# Patient Record
Sex: FEMALE | Race: WHITE | NOT HISPANIC OR LATINO | URBAN - METROPOLITAN AREA
[De-identification: names, ages, dates, MRNs, and addresses within clinical notes are randomized per-mention and may not be internally consistent; named-entity substitution may affect disease eponyms.]

---

## 2017-12-14 ENCOUNTER — OUTPATIENT (OUTPATIENT)
Dept: OUTPATIENT SERVICES | Facility: HOSPITAL | Age: 57
LOS: 1 days | End: 2017-12-14

## 2018-01-03 ENCOUNTER — OUTPATIENT (OUTPATIENT)
Dept: OUTPATIENT SERVICES | Facility: HOSPITAL | Age: 58
LOS: 1 days | End: 2018-01-03

## 2018-10-04 ENCOUNTER — OUTPATIENT (OUTPATIENT)
Dept: OUTPATIENT SERVICES | Facility: HOSPITAL | Age: 58
LOS: 1 days | End: 2018-10-04

## 2019-09-18 ENCOUNTER — APPOINTMENT (OUTPATIENT)
Dept: MRI IMAGING | Facility: CLINIC | Age: 59
End: 2019-09-18
Payer: COMMERCIAL

## 2019-09-18 PROCEDURE — 72148 MRI LUMBAR SPINE W/O DYE: CPT

## 2020-10-13 ENCOUNTER — APPOINTMENT (OUTPATIENT)
Dept: MAMMOGRAPHY | Facility: CLINIC | Age: 60
End: 2020-10-13
Payer: COMMERCIAL

## 2020-10-13 PROCEDURE — 77067 SCR MAMMO BI INCL CAD: CPT

## 2020-10-13 PROCEDURE — 77063 BREAST TOMOSYNTHESIS BI: CPT

## 2020-10-27 ENCOUNTER — TRANSCRIPTION ENCOUNTER (OUTPATIENT)
Age: 60
End: 2020-10-27

## 2020-10-28 ENCOUNTER — APPOINTMENT (OUTPATIENT)
Dept: ULTRASOUND IMAGING | Facility: CLINIC | Age: 60
End: 2020-10-28
Payer: COMMERCIAL

## 2020-10-28 ENCOUNTER — APPOINTMENT (OUTPATIENT)
Dept: MAMMOGRAPHY | Facility: CLINIC | Age: 60
End: 2020-10-28
Payer: COMMERCIAL

## 2020-10-28 PROBLEM — Z00.00 ENCOUNTER FOR PREVENTIVE HEALTH EXAMINATION: Status: ACTIVE | Noted: 2020-10-28

## 2020-10-28 PROCEDURE — 77065 DX MAMMO INCL CAD UNI: CPT | Mod: LT

## 2020-10-28 PROCEDURE — G0279: CPT | Mod: LT

## 2020-10-28 PROCEDURE — 76642 ULTRASOUND BREAST LIMITED: CPT | Mod: LT

## 2020-12-28 ENCOUNTER — OUTPATIENT (OUTPATIENT)
Dept: OUTPATIENT SERVICES | Facility: HOSPITAL | Age: 60
LOS: 1 days | End: 2020-12-28

## 2021-08-30 ENCOUNTER — APPOINTMENT (OUTPATIENT)
Dept: RADIOLOGY | Facility: CLINIC | Age: 61
End: 2021-08-30
Payer: COMMERCIAL

## 2021-08-30 PROCEDURE — 71046 X-RAY EXAM CHEST 2 VIEWS: CPT

## 2021-10-05 ENCOUNTER — APPOINTMENT (OUTPATIENT)
Dept: MAMMOGRAPHY | Facility: CLINIC | Age: 61
End: 2021-10-05
Payer: COMMERCIAL

## 2021-10-05 PROCEDURE — 77067 SCR MAMMO BI INCL CAD: CPT

## 2021-10-05 PROCEDURE — 77063 BREAST TOMOSYNTHESIS BI: CPT

## 2022-01-11 ENCOUNTER — NON-APPOINTMENT (OUTPATIENT)
Age: 62
End: 2022-01-11

## 2022-02-04 ENCOUNTER — LABORATORY RESULT (OUTPATIENT)
Age: 62
End: 2022-02-04

## 2022-02-04 ENCOUNTER — APPOINTMENT (OUTPATIENT)
Dept: RHEUMATOLOGY | Facility: CLINIC | Age: 62
End: 2022-02-04
Payer: COMMERCIAL

## 2022-02-04 ENCOUNTER — NON-APPOINTMENT (OUTPATIENT)
Age: 62
End: 2022-02-04

## 2022-02-04 VITALS
TEMPERATURE: 97.2 F | WEIGHT: 132 LBS | BODY MASS INDEX: 20.72 KG/M2 | SYSTOLIC BLOOD PRESSURE: 150 MMHG | OXYGEN SATURATION: 99 % | DIASTOLIC BLOOD PRESSURE: 74 MMHG | HEIGHT: 67 IN | HEART RATE: 73 BPM

## 2022-02-04 PROCEDURE — 99204 OFFICE O/P NEW MOD 45 MIN: CPT | Mod: 25

## 2022-02-04 PROCEDURE — 36415 COLL VENOUS BLD VENIPUNCTURE: CPT

## 2022-02-04 NOTE — REASON FOR VISIT
Addended by: FRANCESCO FLOYD on: 11/14/2018 07:45 AM     Modules accepted: Orders     [Initial Evaluation] : an initial evaluation

## 2022-02-04 NOTE — ASSESSMENT
[FreeTextEntry1] : 60 y/o female w/ psoriasis referred to rheumatology for positive KENA and evaluation for underlying rheum disease. \par Pt reports 14 months of dizziness, intermittent pruritus, blisters on fingertips, underside of toes with burning feelings, redness of knuckles, pain in achilles, sore throat, tongue, swollen throat, numbness of mouth, throat, lips, burning chest pain, cough, mucus in throat. Pt has mild psoriasis of face and scalp - face was treated with laser therapy. Pt has seen multiple specialists for these symptoms with diagnosis only of laryngeal reflux. Pt has been treated for GERD with PPI without any improvement in the chest burning.\par No family Hx of rheum diseases\par Workup by PCP with KENA+, Coulter+\par \par Patient has various non-specific symptoms without diagnosis x 14 months, but pt has not seen a rheumatologist. Pt's symptoms are not quite classic for SLE. However, some of patient's symptoms such as blisters of fingers/toes in cold temperature (Raynaud-like?), rashes, GI issues are concerning for entities such as myositis and scleroderma and should be evaluated. Pt also has psoriasis which can be associated with PsA but pt does not have inflammatory joint pains, uveitis, dactylitis, nail changes. Pt does reports achilles pain and swelling (enthesitis).\par \par - Obtain labwork to evaluate for underlying autoimmune inflammatory rheumatic diseases\par - RTC 1 month to discuss results and next steps\par

## 2022-02-04 NOTE — HISTORY OF PRESENT ILLNESS
[FreeTextEntry1] : 62 y/o female w/ psoriasis referred to rheumatology for positive KENA and evaluation for underlying rheum disease. \par Pt reports 14 months of dizziness, intermittent pruritus, blisters on fingertips, underside of toes with burning feelings, redness of knuckles, pain in achilles, sore throat, tongue, swollen throat, numbness of mouth, throat, lips, burning chest pain, cough, mucus in throat. Pt has mild psoriasis of face and scalp - face was treated with laser therapy. Pt has seen multiple specialists for these symptoms with diagnosis only of laryngeal reflux. Pt has been treated for GERD with PPI without any improvement in the chest burning.\par Pt denies uveitis, dactylitis, nail changes.\par No family Hx of rheum diseases\par \par WORKUP: \par Remarkable for (12/2021): KENA+, Coulter+ \par Normal/neg (12/2021): CBC, CMP, ESR, CRP, dsDNA, SSA, SSB, RNP, B12, Vit D 25-OH, TSH, HgbA1C, Lyme\par \par

## 2022-02-06 LAB
25(OH)D3 SERPL-MCNC: 78.8 NG/ML
ALBUMIN SERPL ELPH-MCNC: 4.9 G/DL
ALP BLD-CCNC: 89 U/L
ALT SERPL-CCNC: 26 U/L
ANION GAP SERPL CALC-SCNC: 14 MMOL/L
APPEARANCE: CLEAR
AST SERPL-CCNC: 21 U/L
BASOPHILS # BLD AUTO: 0.03 K/UL
BASOPHILS NFR BLD AUTO: 0.9 %
BILIRUB SERPL-MCNC: 0.4 MG/DL
BILIRUBIN URINE: NEGATIVE
BLOOD URINE: NEGATIVE
BUN SERPL-MCNC: 17 MG/DL
C3 SERPL-MCNC: 100 MG/DL
C4 SERPL-MCNC: 23 MG/DL
CALCIUM SERPL-MCNC: 10.1 MG/DL
CHLORIDE SERPL-SCNC: 99 MMOL/L
CK SERPL-CCNC: 69 U/L
CO2 SERPL-SCNC: 25 MMOL/L
COLOR: NORMAL
CREAT SERPL-MCNC: 0.7 MG/DL
CREAT SPEC-SCNC: 17 MG/DL
CREAT/PROT UR: 0.3 RATIO
CRP SERPL-MCNC: <3 MG/L
EOSINOPHIL # BLD AUTO: 0.02 K/UL
EOSINOPHIL NFR BLD AUTO: 0.6 %
ERYTHROCYTE [SEDIMENTATION RATE] IN BLOOD BY WESTERGREN METHOD: 15 MM/HR
ESTIMATED AVERAGE GLUCOSE: 120 MG/DL
FERRITIN SERPL-MCNC: 224 NG/ML
FOLATE SERPL-MCNC: >20 NG/ML
GLUCOSE QUALITATIVE U: NEGATIVE
GLUCOSE SERPL-MCNC: 93 MG/DL
HBA1C MFR BLD HPLC: 5.8 %
HBV CORE IGG+IGM SER QL: NONREACTIVE
HBV SURFACE AB SER QL: NONREACTIVE
HBV SURFACE AG SER QL: NONREACTIVE
HCT VFR BLD CALC: 39 %
HCV AB SER QL: NONREACTIVE
HCV S/CO RATIO: 0.19 S/CO
HGB BLD-MCNC: 12.9 G/DL
IMM GRANULOCYTES NFR BLD AUTO: 0 %
IRON SATN MFR SERPL: 28 %
IRON SERPL-MCNC: 78 UG/DL
KETONES URINE: NEGATIVE
LDH SERPL-CCNC: 152 U/L
LEUKOCYTE ESTERASE URINE: ABNORMAL
LYMPHOCYTES # BLD AUTO: 0.98 K/UL
LYMPHOCYTES NFR BLD AUTO: 28.6 %
MAN DIFF?: NORMAL
MCHC RBC-ENTMCNC: 33.1 GM/DL
MCHC RBC-ENTMCNC: 33.3 PG
MCV RBC AUTO: 100.8 FL
MONOCYTES # BLD AUTO: 0.21 K/UL
MONOCYTES NFR BLD AUTO: 6.1 %
NEUTROPHILS # BLD AUTO: 2.19 K/UL
NEUTROPHILS NFR BLD AUTO: 63.8 %
NITRITE URINE: NEGATIVE
PH URINE: 6
PLATELET # BLD AUTO: 305 K/UL
POTASSIUM SERPL-SCNC: 4 MMOL/L
PROT SERPL-MCNC: 7.4 G/DL
PROT UR-MCNC: 5 MG/DL
PROTEIN URINE: NEGATIVE
RBC # BLD: 3.87 M/UL
RBC # FLD: 12.5 %
RHEUMATOID FACT SER QL: <10 IU/ML
SODIUM SERPL-SCNC: 137 MMOL/L
SPECIFIC GRAVITY URINE: 1.01
THYROGLOB AB SERPL-ACNC: <20 IU/ML
THYROPEROXIDASE AB SERPL IA-ACNC: <10 IU/ML
TIBC SERPL-MCNC: 282 UG/DL
TSH SERPL-ACNC: 1.87 UIU/ML
UIBC SERPL-MCNC: 204 UG/DL
UROBILINOGEN URINE: NORMAL
VIT B12 SERPL-MCNC: 492 PG/ML
WBC # FLD AUTO: 3.43 K/UL

## 2022-02-07 LAB — ALDOLASE SERPL-CCNC: 1.7 U/L

## 2022-02-08 LAB
ALBUMIN MFR SERPL ELPH: 63.7 %
ALBUMIN SERPL-MCNC: 4.7 G/DL
ALBUMIN/GLOB SERPL: 1.7 RATIO
ALPHA1 GLOB MFR SERPL ELPH: 3.9 %
ALPHA1 GLOB SERPL ELPH-MCNC: 0.3 G/DL
ALPHA2 GLOB MFR SERPL ELPH: 8.1 %
ALPHA2 GLOB SERPL ELPH-MCNC: 0.6 G/DL
B-GLOBULIN MFR SERPL ELPH: 10.9 %
B-GLOBULIN SERPL ELPH-MCNC: 0.8 G/DL
CCP AB SER IA-ACNC: <8 UNITS
CRYOGLOB SERPL-MCNC: NEGATIVE
GAMMA GLOB FLD ELPH-MCNC: 1 G/DL
GAMMA GLOB MFR SERPL ELPH: 13.4 %
INTERPRETATION SERPL IEP-IMP: NORMAL
PROT SERPL-MCNC: 7.4 G/DL
PROT SERPL-MCNC: 7.4 G/DL
RF+CCP IGG SER-IMP: NEGATIVE

## 2022-02-09 LAB
ANA PAT FLD IF-IMP: ABNORMAL
ANA SER IF-ACNC: ABNORMAL
CENTROMERE IGG SER-ACNC: <0.2 CD:130001892
ENA JO1 AB SER IA-ACNC: <0.2 AL
ENA SCL70 IGG SER IA-ACNC: <0.2 AL
M TB IFN-G BLD-IMP: NEGATIVE
QUANTIFERON TB PLUS MITOGEN MINUS NIL: 9.86 IU/ML
QUANTIFERON TB PLUS NIL: 0.14 IU/ML
QUANTIFERON TB PLUS TB1 MINUS NIL: -0.06 IU/ML
QUANTIFERON TB PLUS TB2 MINUS NIL: -0.06 IU/ML

## 2022-02-11 LAB — RNA POLYMERASE III IGG: 4 UNITS

## 2022-02-24 LAB
EJ AB SER QL: NEGATIVE
ENA JO1 AB SER IA-ACNC: <20 UNITS
ENA PM/SCL AB SER-ACNC: <20 UNITS
ENA SM+RNP AB SER IA-ACNC: 53 UNITS
ENA SS-A IGG SER QL: <20 UNITS
FIBRILLARIN AB SER QL: NEGATIVE
KU AB SER QL: NEGATIVE
MDA-5 (P140)(CADM-140): <20 UNITS
MI2 AB SER QL: NEGATIVE
NXP-2 (P140): <20 UNITS
OJ AB SER QL: NEGATIVE
PL12 AB SER QL: NEGATIVE
PL7 AB SER QL: NEGATIVE
SRP AB SERPL QL: NEGATIVE
TIF GAMMA (P155/140): <20 UNITS
U2 SNRNP AB SER QL: NEGATIVE

## 2022-03-04 ENCOUNTER — APPOINTMENT (OUTPATIENT)
Dept: RHEUMATOLOGY | Facility: CLINIC | Age: 62
End: 2022-03-04
Payer: COMMERCIAL

## 2022-03-04 VITALS
SYSTOLIC BLOOD PRESSURE: 134 MMHG | BODY MASS INDEX: 20.72 KG/M2 | HEIGHT: 67 IN | WEIGHT: 132 LBS | OXYGEN SATURATION: 100 % | DIASTOLIC BLOOD PRESSURE: 80 MMHG | HEART RATE: 66 BPM

## 2022-03-04 DIAGNOSIS — R42 DIZZINESS AND GIDDINESS: ICD-10-CM

## 2022-03-04 PROCEDURE — 99215 OFFICE O/P EST HI 40 MIN: CPT

## 2022-03-04 NOTE — ASSESSMENT
[FreeTextEntry1] : 62 y/o female presents as follow up for MCTD. \par Pt reports 14 months of dizziness, intermittent pruritus, blisters on fingertips, underside of toes with burning feelings, redness of knuckles, pain in achilles, sore throat, tongue, swollen throat, numbness of mouth, throat, lips, burning chest pain, cough, mucus in throat. Pt has mild psoriasis of face and scalp - face was treated with laser therapy. Pt has seen multiple specialists for these symptoms with diagnosis only of laryngeal reflux. Pt has been treated for GERD with PPI without any improvement in the chest burning. \par No family Hx of rheum diseases\par \par Workup by me with KENA+, Smith+, U1-RNP+, mild known leukopenia.\par Based on patient's joint pains, Raynaud's, rashes, labwork, pt's presentation is most consistent with MCTD.\par At this time there is no known internal organ involvement of MCTD at this time.\par \par - Obtain 24 hr urine protein\par - Start HCQ 300mg PO daily for MCTD. HCQ dosage is <5mg/kg/day or <400mg/day to minimize risk of retinal toxicity.\par Side effects of HCQ were discussed with the patient including but not limited to GI upset, retinal toxicity, QT prolongation, cytopenias, myopathy. Discussed the importance of yearly ophthalmology evaluation.\par - Follow up with ophthalmology\par - Pt advised to continue to f/u with GI for dysphagia. Pt underwent EGD and barium swallow 2021 which was reportedly normal. Pt's CPK and aldolase normal. If significantly worsening symptoms and objective signs of esophageal involvement, will consider MTX, AZA, IVIG, etc.\par - Pt has Raynaud's with digital tip ulcers. Discussed pharmacological treatment for it but pt would like to defer at this time as any medications that lower blood pressure messes with her migraines. All medications for Raynaud's will lower BP. Pt advised to watch for worsening symptoms, more significant ulcers in fingertips for need for further treatment.\par - Pt denies any pulmonary/cardiac symptoms at this time. If any significant symptoms, will consider CXR (reportedly normal 9/2021), echo, PFTs in future\par - RTC 3 months for follow up\par \par \par

## 2022-03-04 NOTE — HISTORY OF PRESENT ILLNESS
[FreeTextEntry1] : HISTORY: \par 60 y/o female presents as follow up for MCTD. \par Pt reports 14 months of dizziness, intermittent pruritus, blisters on fingertips, underside of toes with burning feelings, redness of knuckles, pain in achilles, sore throat, tongue, swollen throat, numbness of mouth, throat, lips, burning chest pain, cough, mucus in throat. Pt has mild psoriasis of face and scalp - face was treated with laser therapy. Pt has seen multiple specialists for these symptoms with diagnosis only of laryngeal reflux. Pt has been treated for GERD with PPI without any improvement in the chest burning. \par No family Hx of rheum diseases \par Workup by PCP with KENA+, Coulter+ \par \par INTERVAL HISTORY: \par Pt's various symptoms have not changed. Continues to have b/l fingertips and toes blisters withe cold temperature, redness of knuckles, dizziness, pruritis, dysphagia, etc.\par Denies any new symptoms compared to last visit.\par \par WORKUP:\par Remarkable for (2/2022): KENA 1:160 speckled, U1-RNP Ab 53 (nml <20), Coulter+, UPCR 0.3, HgbA1C 5.8%, ferritin 224, WBC 3.43 \par Normal/neg (2/2022): CBC (except WBC), ESR, CRP, C3, C4, SSA, SSB, RNP, Thyroid Ab, CCP, cryoglobulins, ANCAs, APLS labs, SCl-70, centromere Ab, RNA Poll III, Radha-1, Myomarker except U1-RNP, CPK, aldolase, TSH, B12, Folate, Vit D 25-OH, iron panel, LDH, SPEP, Lyme, Hep B/C, Quantiferon TB\par

## 2022-03-18 LAB
CREAT 24H UR-MCNC: 0.8 G/24 H
CREAT ?TM UR-MCNC: 25 MG/DL
PROT 24H UR-MRATE: 10 MG/DL
PROT ?TM UR-MCNC: 24 HR
PROT UR-MCNC: 300 MG/24 H
SPECIMEN VOL 24H UR: 3000 ML

## 2022-03-21 ENCOUNTER — NON-APPOINTMENT (OUTPATIENT)
Age: 62
End: 2022-03-21

## 2022-04-07 ENCOUNTER — NON-APPOINTMENT (OUTPATIENT)
Age: 62
End: 2022-04-07

## 2022-06-10 ENCOUNTER — APPOINTMENT (OUTPATIENT)
Dept: RHEUMATOLOGY | Facility: CLINIC | Age: 62
End: 2022-06-10
Payer: COMMERCIAL

## 2022-06-10 VITALS
DIASTOLIC BLOOD PRESSURE: 77 MMHG | WEIGHT: 133 LBS | OXYGEN SATURATION: 98 % | HEART RATE: 81 BPM | HEIGHT: 67 IN | TEMPERATURE: 97.8 F | SYSTOLIC BLOOD PRESSURE: 145 MMHG | BODY MASS INDEX: 20.88 KG/M2

## 2022-06-10 DIAGNOSIS — K21.9 GASTRO-ESOPHAGEAL REFLUX DISEASE W/OUT ESOPHAGITIS: ICD-10-CM

## 2022-06-10 PROCEDURE — 99214 OFFICE O/P EST MOD 30 MIN: CPT | Mod: 25

## 2022-06-10 PROCEDURE — 36415 COLL VENOUS BLD VENIPUNCTURE: CPT

## 2022-06-10 NOTE — ASSESSMENT
[FreeTextEntry1] : 60 y/o female presents as follow up for MCTD.  \par Pt reports 14 months of dizziness, intermittent pruritus, blisters on fingertips, underside of toes with burning feelings, redness of knuckles, pain in achilles, sore throat, tongue, swollen throat, numbness of mouth, throat, lips, burning chest pain, cough, mucus in throat. Pt has mild psoriasis of face and scalp - face was treated with laser therapy. Pt has seen multiple specialists for these symptoms with diagnosis only of laryngeal reflux. Pt has been treated for GERD with PPI without any improvement in the chest burning.  \par No family Hx of rheum diseases \par \par Workup by me with KENA+, Smith+, U1-RNP+, mild known leukopenia. \par Based on patient's joint pains, Raynaud's, rashes, labwork, pt's presentation is most consistent with MCTD. \par At this time there is no known internal organ involvement of MCTD at this time. \par \par Pt was started on HCQ on 3/2022 but stopped due to possible side effect of unsteadiness, numbness/tingling of b/l legs, worsened irritability and depression with resolution of symptoms.\par \par - Obtain labwork for any organ involvement of MCTD, follow up of mild proteinuria\par - Will defer starting any new DMARDs such as MTX, AZA at this time unless there are severe symptoms or objective signs of organ involvement.\par - Pt advised to continue to f/u with GI for dysphagia. Pt underwent EGD and barium swallow 2021 which was reportedly normal. Pt's CPK and aldolase normal. If significantly worsening symptoms and objective signs of esophageal involvement, will consider MTX, AZA, IVIG, etc. \par - Pt has Raynaud's with digital tip ulcers (more stable now with warmer weather). Discussed pharmacological treatment for it but pt would like to defer at this time as any medications that lower blood pressure messes with her migraines. All medications for Raynaud's will lower BP. Pt advised to watch for worsening symptoms, more significant ulcers in fingertips for need for further treatment. \par - Pt denies any pulmonary/cardiac symptoms at this time. If any significant symptoms, will consider CXR (reportedly normal 8/2021), echo, PFTs (8/2021) in future \par - RTC 4 months for follow up \par

## 2022-06-10 NOTE — HISTORY OF PRESENT ILLNESS
[FreeTextEntry1] : HISTORY:\par 62 y/o female presents as follow up for MCTD.  \par Pt reports 14 months of dizziness, intermittent pruritus, blisters on fingertips, underside of toes with burning feelings, redness of knuckles, pain in achilles, sore throat, tongue, swollen throat, numbness of mouth, throat, lips, burning chest pain, cough, mucus in throat. Pt has mild psoriasis of face and scalp - face was treated with laser therapy. Pt has seen multiple specialists for these symptoms with diagnosis only of laryngeal reflux. Pt has been treated for GERD with PPI without any improvement in the chest burning.  \par No family Hx of rheum diseases \par \par Workup by me with KENA+, Smith+, U1-RNP+, mild known leukopenia. \par Based on patient's joint pains, Raynaud's, rashes, labwork, pt's presentation is most consistent with MCTD. \par At this time there is no known internal organ involvement of MCTD at this time. \par \par INTERVAL HISTORY: \par Pt was started on HCQ on 3/2022 but stopped due to possible side effect of unsteadiness, numbness/tingling of b/l legs, worsened irritability and depression. Pt was advised to stop HCQ and reports these symptoms resolved.\par Pt states with warmer temperature, her Raynaud's have significantly improved. Her joint pains have been well under control and no major rashes.\par \par WORKUP: \par Remarkable for (2/2022 - 3/2022): KENA 1:160 speckled, U1-RNP Ab 53 (nml <20), Coulter+, UPCR 0.3, 24-hr urine protein 300, HgbA1C 5.8%, ferritin 224, WBC 3.43  \par Normal/neg (2/2022): CBC (except WBC), ESR, CRP, C3, C4, SSA, SSB, RNP, Thyroid Ab, CCP, cryoglobulins, ANCAs, APLS labs, SCl-70, centromere Ab, RNA Poll III, Radha-1, Myomarker except U1-RNP, CPK, aldolase, TSH, B12, Folate, Vit D 25-OH, iron panel, LDH, SPEP, Lyme, Hep B/C, Quantiferon TB\par

## 2022-06-12 LAB
ALBUMIN SERPL ELPH-MCNC: 4.4 G/DL
ALP BLD-CCNC: 97 U/L
ALT SERPL-CCNC: 20 U/L
ANION GAP SERPL CALC-SCNC: 13 MMOL/L
APPEARANCE: CLEAR
AST SERPL-CCNC: 17 U/L
BASOPHILS # BLD AUTO: 0.04 K/UL
BASOPHILS NFR BLD AUTO: 0.7 %
BILIRUB SERPL-MCNC: 0.2 MG/DL
BILIRUBIN URINE: NEGATIVE
BLOOD URINE: NEGATIVE
BUN SERPL-MCNC: 20 MG/DL
CALCIUM SERPL-MCNC: 10.1 MG/DL
CHLORIDE SERPL-SCNC: 100 MMOL/L
CO2 SERPL-SCNC: 27 MMOL/L
COLOR: COLORLESS
CREAT SERPL-MCNC: 0.67 MG/DL
CREAT SPEC-SCNC: 14 MG/DL
CREAT/PROT UR: 0.3 RATIO
CRP SERPL-MCNC: 7 MG/L
DSDNA AB SER-ACNC: <12 IU/ML
EGFR: 101 ML/MIN/1.73M2
EOSINOPHIL # BLD AUTO: 0.03 K/UL
EOSINOPHIL NFR BLD AUTO: 0.5 %
ERYTHROCYTE [SEDIMENTATION RATE] IN BLOOD BY WESTERGREN METHOD: 21 MM/HR
GLUCOSE QUALITATIVE U: NEGATIVE
GLUCOSE SERPL-MCNC: 101 MG/DL
HCT VFR BLD CALC: 38.6 %
HGB BLD-MCNC: 12.4 G/DL
IMM GRANULOCYTES NFR BLD AUTO: 0.4 %
KETONES URINE: NEGATIVE
LEUKOCYTE ESTERASE URINE: NEGATIVE
LYMPHOCYTES # BLD AUTO: 1.04 K/UL
LYMPHOCYTES NFR BLD AUTO: 18.8 %
MAN DIFF?: NORMAL
MCHC RBC-ENTMCNC: 32.1 GM/DL
MCHC RBC-ENTMCNC: 32.8 PG
MCV RBC AUTO: 102.1 FL
MONOCYTES # BLD AUTO: 0.29 K/UL
MONOCYTES NFR BLD AUTO: 5.3 %
NEUTROPHILS # BLD AUTO: 4.1 K/UL
NEUTROPHILS NFR BLD AUTO: 74.3 %
NITRITE URINE: NEGATIVE
PH URINE: 6.5
PLATELET # BLD AUTO: 424 K/UL
POTASSIUM SERPL-SCNC: 4.6 MMOL/L
PROT SERPL-MCNC: 7.1 G/DL
PROT UR-MCNC: 5 MG/DL
PROTEIN URINE: NEGATIVE
RBC # BLD: 3.78 M/UL
RBC # FLD: 12.1 %
SODIUM SERPL-SCNC: 139 MMOL/L
SPECIFIC GRAVITY URINE: 1
UROBILINOGEN URINE: NORMAL
WBC # FLD AUTO: 5.52 K/UL

## 2022-06-13 LAB
C3 SERPL-MCNC: 137 MG/DL
C4 SERPL-MCNC: 26 MG/DL

## 2022-10-05 ENCOUNTER — APPOINTMENT (OUTPATIENT)
Dept: MAMMOGRAPHY | Facility: CLINIC | Age: 62
End: 2022-10-05

## 2022-10-05 PROCEDURE — 77067 SCR MAMMO BI INCL CAD: CPT

## 2022-10-05 PROCEDURE — 77063 BREAST TOMOSYNTHESIS BI: CPT

## 2022-10-14 ENCOUNTER — APPOINTMENT (OUTPATIENT)
Dept: RHEUMATOLOGY | Facility: CLINIC | Age: 62
End: 2022-10-14

## 2022-10-14 VITALS
RESPIRATION RATE: 16 BRPM | HEART RATE: 69 BPM | DIASTOLIC BLOOD PRESSURE: 76 MMHG | TEMPERATURE: 98 F | OXYGEN SATURATION: 100 % | SYSTOLIC BLOOD PRESSURE: 144 MMHG

## 2022-10-14 DIAGNOSIS — S60.429A BLISTER (NONTHERMAL) OF UNSPECIFIED FINGER, INITIAL ENCOUNTER: ICD-10-CM

## 2022-10-14 PROCEDURE — 36415 COLL VENOUS BLD VENIPUNCTURE: CPT

## 2022-10-14 PROCEDURE — 99214 OFFICE O/P EST MOD 30 MIN: CPT | Mod: 25

## 2022-10-14 RX ORDER — HYDROXYCHLOROQUINE SULFATE 200 MG/1
200 TABLET, FILM COATED ORAL
Qty: 45 | Refills: 3 | Status: COMPLETED | COMMUNITY
Start: 2022-03-04 | End: 2022-10-14

## 2022-10-14 NOTE — HISTORY OF PRESENT ILLNESS
[FreeTextEntry1] : HISTORY: \par 61 y/o female presents as follow up for MCTD.  \par Pt reports 14 months of dizziness, intermittent pruritus, blisters on fingertips, underside of toes with burning feelings, redness of knuckles, pain in achilles, sore throat, tongue, swollen throat, numbness of mouth, throat, lips, burning chest pain, cough, mucus in throat. Pt has mild psoriasis of face and scalp - face was treated with laser therapy. Pt has seen multiple specialists for these symptoms with diagnosis only of laryngeal reflux. Pt has been treated for GERD with PPI without any improvement in the chest burning.  \par No family Hx of rheum diseases  \par \par Workup by me with KENA+, Smith+, U1-RNP+, mild known leukopenia.  \par Based on patient's joint pains, Raynaud's, rashes, labwork, pt's presentation is most consistent with MCTD. At this time there is no known internal organ involvement of MCTD at this time.  \par Pt was started on HCQ on 3/2022 but stopped due to possible side effect of unsteadiness, numbness/tingling of b/l legs, worsened irritability and depression with resolution of symptoms. \par \par INTERVAL HISTORY: \par Pt reports that with colder temperature, pt is having more hand pain and stiffness and starting to have Raynaud's again. Pt reports having digital tip ulcers in the past cary. Denies other symptoms or concerns today.\par \par WORKUP:  \par Remarkable for (2/2022 - 3/2022): KENA 1:160 speckled, U1-RNP Ab 53 (nml <20), Coulter+, UPCR 0.3, 24-hr urine protein 300, HgbA1C 5.8%, ferritin 224, WBC 3.43  \par Normal/neg (2/2022): CBC (except WBC), ESR, CRP, C3, C4, SSA, SSB, RNP, Thyroid Ab, CCP, cryoglobulins, ANCAs, APLS labs, SCl-70, centromere Ab, RNA Poll III, Radha-1, Myomarker except U1-RNP, CPK, aldolase, TSH, B12, Folate, Vit D 25-OH, iron panel, LDH, SPEP, Lyme, Hep B/C, Quantiferon TB\par

## 2022-10-14 NOTE — ASSESSMENT
[FreeTextEntry1] : 63 y/o female presents as follow up for MCTD.  \par Pt reports 14 months of dizziness, intermittent pruritus, blisters on fingertips, underside of toes with burning feelings, redness of knuckles, pain in achilles, sore throat, tongue, swollen throat, numbness of mouth, throat, lips, burning chest pain, cough, mucus in throat. Pt has mild psoriasis of face and scalp - face was treated with laser therapy. Pt has seen multiple specialists for these symptoms with diagnosis only of laryngeal reflux. Pt has been treated for GERD with PPI without any improvement in the chest burning.  \par No family Hx of rheum diseases  \par \par Workup by me with KENA+, Smith+, U1-RNP+, mild known leukopenia.  \par Based on patient's joint pains, Raynaud's, rashes, labwork, pt's presentation is most consistent with MCTD. Labwork without any internal organ involvement of MCTD.  \par Pt was started on HCQ on 3/2022 but stopped due to possible side effect of unsteadiness, numbness/tingling of b/l legs, worsened irritability and depression with resolution of symptoms.\par \par Pt has significant Raynaud's with digital tip ulcers and medical treatment of Raynaud's was discussed.\par \par - Obtain labwork for MCTD activity\par - Rx amlodipine 5mg PO daily PRN for Raynaud's. Advised to watch for signs of low BP including dizziness, lightheadedness, and b/l LE swelling. Advised to call to try higher doses (up to 15mg/day). Patient was counselled about Raynaud precautions, including dressing more warmly, avoidance of prolonged cold exposure, stress, and tobacco exposure.\par - Will defer starting any new DMARDs such as MTX, AZA at this time unless there are severe symptoms or objective signs of organ involvement. \par - Pt advised to continue to f/u with GI for dysphagia. Pt underwent EGD and barium swallow 2021 which was reportedly normal. Pt's CPK and aldolase normal. If significantly worsening symptoms and objective signs of esophageal involvement, will consider MTX, AZA, IVIG, etc.  \par - Pt denies any pulmonary/cardiac symptoms at this time. If any significant symptoms, will consider CXR (reportedly normal 8/2021), echo, PFTs (8/2021) in future  \par - RTC 3 months for follow up \par

## 2022-10-16 LAB
ALBUMIN SERPL ELPH-MCNC: 4.7 G/DL
ALP BLD-CCNC: 85 U/L
ALT SERPL-CCNC: 28 U/L
ANION GAP SERPL CALC-SCNC: 9 MMOL/L
APPEARANCE: CLEAR
AST SERPL-CCNC: 25 U/L
BASOPHILS # BLD AUTO: 0.03 K/UL
BASOPHILS NFR BLD AUTO: 0.8 %
BILIRUB SERPL-MCNC: 0.2 MG/DL
BILIRUBIN URINE: NEGATIVE
BLOOD URINE: NEGATIVE
BUN SERPL-MCNC: 23 MG/DL
C3 SERPL-MCNC: 78 MG/DL
C4 SERPL-MCNC: 19 MG/DL
CALCIUM SERPL-MCNC: 9.7 MG/DL
CHLORIDE SERPL-SCNC: 103 MMOL/L
CO2 SERPL-SCNC: 26 MMOL/L
COLOR: YELLOW
CREAT SERPL-MCNC: 0.75 MG/DL
CREAT SPEC-SCNC: 90 MG/DL
CREAT/PROT UR: 0.1 RATIO
CRP SERPL-MCNC: <3 MG/L
EGFR: 91 ML/MIN/1.73M2
EOSINOPHIL # BLD AUTO: 0.03 K/UL
EOSINOPHIL NFR BLD AUTO: 0.8 %
ERYTHROCYTE [SEDIMENTATION RATE] IN BLOOD BY WESTERGREN METHOD: 8 MM/HR
GLUCOSE QUALITATIVE U: NEGATIVE
GLUCOSE SERPL-MCNC: 90 MG/DL
HCT VFR BLD CALC: 36.3 %
HGB BLD-MCNC: 12.3 G/DL
IMM GRANULOCYTES NFR BLD AUTO: 0.3 %
KETONES URINE: NEGATIVE
LEUKOCYTE ESTERASE URINE: NEGATIVE
LYMPHOCYTES # BLD AUTO: 0.97 K/UL
LYMPHOCYTES NFR BLD AUTO: 25.5 %
MAN DIFF?: NORMAL
MCHC RBC-ENTMCNC: 32.9 PG
MCHC RBC-ENTMCNC: 33.9 GM/DL
MCV RBC AUTO: 97.1 FL
MONOCYTES # BLD AUTO: 0.32 K/UL
MONOCYTES NFR BLD AUTO: 8.4 %
NEUTROPHILS # BLD AUTO: 2.45 K/UL
NEUTROPHILS NFR BLD AUTO: 64.2 %
NITRITE URINE: NEGATIVE
PH URINE: 6
PLATELET # BLD AUTO: 265 K/UL
POTASSIUM SERPL-SCNC: 3.9 MMOL/L
PROT SERPL-MCNC: 6.5 G/DL
PROT UR-MCNC: 7 MG/DL
PROTEIN URINE: NEGATIVE
RBC # BLD: 3.74 M/UL
RBC # FLD: 11.7 %
SODIUM SERPL-SCNC: 138 MMOL/L
SPECIFIC GRAVITY URINE: 1.02
UROBILINOGEN URINE: NORMAL
WBC # FLD AUTO: 3.81 K/UL

## 2022-10-30 LAB
TPMT COMMENT: NORMAL
TPMT GENE MUT ANL BLD/T: NORMAL
TPMT GENETICS TEST: NORMAL
TPMT INTERPRETATION: NORMAL

## 2023-01-13 ENCOUNTER — APPOINTMENT (OUTPATIENT)
Dept: RHEUMATOLOGY | Facility: CLINIC | Age: 63
End: 2023-01-13
Payer: COMMERCIAL

## 2023-01-13 VITALS
BODY MASS INDEX: 20.88 KG/M2 | HEIGHT: 67 IN | SYSTOLIC BLOOD PRESSURE: 130 MMHG | TEMPERATURE: 98.1 F | OXYGEN SATURATION: 100 % | WEIGHT: 133 LBS | HEART RATE: 86 BPM | DIASTOLIC BLOOD PRESSURE: 78 MMHG

## 2023-01-13 PROCEDURE — 99214 OFFICE O/P EST MOD 30 MIN: CPT

## 2023-01-13 RX ORDER — AMLODIPINE BESYLATE 5 MG/1
5 TABLET ORAL DAILY
Qty: 30 | Refills: 2 | Status: COMPLETED | COMMUNITY
Start: 2022-10-14 | End: 2023-01-13

## 2023-01-13 NOTE — ASSESSMENT
[FreeTextEntry1] : 63 y/o female presents as follow up for MCTD.  \par Pt reports 14 months of dizziness, intermittent pruritus, blisters on fingertips, underside of toes with burning feelings, redness of knuckles, pain in achilles, sore throat, tongue, swollen throat, numbness of mouth, throat, lips, burning chest pain, cough, mucus in throat. Pt has mild psoriasis of face and scalp - face was treated with laser therapy. Pt has seen multiple specialists for these symptoms with diagnosis only of laryngeal reflux. Pt has been treated for GERD with PPI without any improvement in the chest burning.  \par No family Hx of rheum diseases  \par \par Workup by me with KENA+, Smith+, U1-RNP+, mild known leukopenia.  \par Based on patient's joint pains, Raynaud's, rashes, labwork, pt's presentation is most consistent with MCTD. Labwork without any internal organ involvement of MCTD.  \par Pt was started on HCQ on 3/2022 but stopped due to possible side effect of unsteadiness, numbness/tingling of b/l legs, worsened irritability and depression with resolution of symptoms.\par \par Pt was tried on amlodipine 5mg/day for Raynaud's but stopped due to weight gain (not water retention) as side effect. Pt's Rayanud's symptoms currently stable. \par \par Pt has scalp psoriasis refractory to topical treatment with associated patchy alopecia and would like to start systemic medication for psoriasis.\par \par - Stop amlodipine. Pt to let me know if worsening Raynaud's to discuss other CCBs, sildenafil, etc.\par - Rx Otezla titration and maintenance for refractory psoriasis. Discussed directions, side effects. This was prescribed with the understanding that her dermatologist would continue this medication if he feels is appropriate, as she does not have signs of psoriatic arthritis\par - Will defer starting any new DMARDs such as MTX, AZA at this time unless there are severe symptoms or objective signs of organ involvement.  \par - Pt advised to continue to f/u with GI if worsening dysphagia. Pt underwent EGD and barium swallow 2021 which was reportedly normal. Pt's CPK and aldolase normal. If significantly worsening symptoms and objective signs of esophageal involvement, will consider MTX, AZA, IVIG, etc.  \par - Pt denies any pulmonary/cardiac symptoms at this time. If any significant symptoms, will consider CXR (reportedly normal 8/2021), echo, PFTs (8/2021) in future  \par - RTC 6 months for follow up \par

## 2023-01-13 NOTE — HISTORY OF PRESENT ILLNESS
[FreeTextEntry1] : HISTORY: \par 61 y/o female presents as follow up for MCTD.  \par Pt reports 14 months of dizziness, intermittent pruritus, blisters on fingertips, underside of toes with burning feelings, redness of knuckles, pain in achilles, sore throat, tongue, swollen throat, numbness of mouth, throat, lips, burning chest pain, cough, mucus in throat. Pt has mild psoriasis of face and scalp - face was treated with laser therapy. Pt has seen multiple specialists for these symptoms with diagnosis only of laryngeal reflux. Pt has been treated for GERD with PPI without any improvement in the chest burning.  \par No family Hx of rheum diseases  \par \par Workup by me with KENA+, Smith+, U1-RNP+, mild known leukopenia.  \par Based on patient's joint pains, Raynaud's, rashes, labwork, pt's presentation is most consistent with MCTD. Labwork without any internal organ involvement of MCTD.  \par Pt was started on HCQ on 3/2022 but stopped due to possible side effect of unsteadiness, numbness/tingling of b/l legs, worsened irritability and depression with resolution of symptoms. \par \par INTERVAL HISTORY: \par Pt states that she has tried amlodipine for 2 weeks but stopped because it has made her gain significant weight which took weeks afterwards to get off. Denies LE swelling. Pt's Raynaud's has been well controlled this winter so far without any blisters or ulcers.\par Pt is currently most concerned about her scalp psoriasis which has not responded to creams. Pt was prescribed vit A cream which gave her an allergic reaction and would like to see if she can start systemic medication at this time.\par \par WORKUP:  \par Remarkable for (2/2022 - 3/2022): KENA 1:160 speckled, U1-RNP Ab 53 (nml <20), Coulter+, UPCR 0.3, 24-hr urine protein 300, HgbA1C 5.8%, ferritin 224, WBC 3.43  \par Normal/neg (2/2022): CBC (except WBC), ESR, CRP, C3, C4, SSA, SSB, RNP, Thyroid Ab, CCP, cryoglobulins, ANCAs, APLS labs, SCl-70, centromere Ab, RNA Poll III, Radha-1, Myomarker except U1-RNP, CPK, aldolase, TSH, B12, Folate, Vit D 25-OH, iron panel, LDH, SPEP, Lyme, Hep B/C, Quantiferon TB\par

## 2023-04-15 ENCOUNTER — NON-APPOINTMENT (OUTPATIENT)
Age: 63
End: 2023-04-15

## 2023-05-11 ENCOUNTER — APPOINTMENT (OUTPATIENT)
Dept: INFECTIOUS DISEASE | Facility: CLINIC | Age: 63
End: 2023-05-11
Payer: COMMERCIAL

## 2023-05-11 VITALS
HEIGHT: 67 IN | TEMPERATURE: 97.8 F | SYSTOLIC BLOOD PRESSURE: 112 MMHG | DIASTOLIC BLOOD PRESSURE: 80 MMHG | BODY MASS INDEX: 21.19 KG/M2 | WEIGHT: 135 LBS

## 2023-05-11 DIAGNOSIS — F41.9 ANXIETY DISORDER, UNSPECIFIED: ICD-10-CM

## 2023-05-11 DIAGNOSIS — Z82.3 FAMILY HISTORY OF STROKE: ICD-10-CM

## 2023-05-11 DIAGNOSIS — Z82.49 FAMILY HISTORY OF ISCHEMIC HEART DISEASE AND OTHER DISEASES OF THE CIRCULATORY SYSTEM: ICD-10-CM

## 2023-05-11 DIAGNOSIS — N95.1 MENOPAUSAL AND FEMALE CLIMACTERIC STATES: ICD-10-CM

## 2023-05-11 DIAGNOSIS — Z78.9 OTHER SPECIFIED HEALTH STATUS: ICD-10-CM

## 2023-05-11 DIAGNOSIS — Z86.69 PERSONAL HISTORY OF OTHER DISEASES OF THE NERVOUS SYSTEM AND SENSE ORGANS: ICD-10-CM

## 2023-05-11 DIAGNOSIS — G43.909 MIGRAINE, UNSPECIFIED, NOT INTRACTABLE, W/OUT STATUS MIGRAINOSUS: ICD-10-CM

## 2023-05-11 DIAGNOSIS — F32.A DEPRESSION, UNSPECIFIED: ICD-10-CM

## 2023-05-11 DIAGNOSIS — Z80.1 FAMILY HISTORY OF MALIGNANT NEOPLASM OF TRACHEA, BRONCHUS AND LUNG: ICD-10-CM

## 2023-05-11 DIAGNOSIS — Z77.22 CONTACT WITH AND (SUSPECTED) EXPOSURE TO ENVIRONMENTAL TOBACCO SMOKE (ACUTE) (CHRONIC): ICD-10-CM

## 2023-05-11 PROCEDURE — 99204 OFFICE O/P NEW MOD 45 MIN: CPT

## 2023-05-11 RX ORDER — BUPROPION HYDROCHLORIDE 300 MG/1
300 TABLET, EXTENDED RELEASE ORAL
Refills: 0 | Status: ACTIVE | COMMUNITY

## 2023-05-11 NOTE — HISTORY OF PRESENT ILLNESS
[FreeTextEntry1] : 62 y.o. woman with mixed connective tissue disease who had been seeing Dr. Parker, also has alopecia related to scalp psoriasis. patient has been treated previously with Otezla with no improvement.  Recently she has been referred over to dermatology office for evaluation of starting Skyrizi.\par \par During the testing, she was found to be positive for tuberculosis.\par Lab results were reviewed with the patient.  And were dictated into the narrative portion of the data.\par \par Patient works at an animal shelter.  She has no significant travel history.  She last travel to [place] in April 2022 and stayed there in [employer] for 5 days.\par \par She has never been incarcerated.  She is not homeless.  She never served in the .\par  He had no fevers, no night sweats, no unexpected weight loss, no coughing up blood, no travel outside country, no known TB exposure. \par \par She reports that about 2 years ago she lost some weight after being diagnosed with reflux leading to modification of her diet.  Since that time she had maintained the same diet and kept off the weight.\par \par As a result of these lab findings, patient had saw her primary care doctor, Dr. Quiles, and had been started on rifampin 600 mg daily for which she has been taking for about 4 weeks.\par \par She reports that her urine is darker, than usual.  But her tears have not changed.\par We went through her medication list together.  They are now updated and current.\par \par

## 2023-05-11 NOTE — DATA REVIEWED
[FreeTextEntry1] : Labs done at [facility] on March 21, 2023, specimen number [id number] was reviewed.  Her CBC is showed a white blood cell count 2.2, hemoglobin 12.9 hematocrit 37.6 platelet count of 306.  Her neutrophils were 50.7%, lymphocytes was 37.2%, monocytes 10.3% eosinophils 0.9% basophil 0.9%.  She had hepatitis B core antibody was nonreactive, hepatitis B surface antibody was less than 5, not immune.\par Hepatitis C antibody was nonreactive.\par Her QuantiFERON TB Gold test showed a note of 6.80, mitogen minus nil of 8.90, TB 1 - no of 3.08, TB 2 - nil 1.71.  Interpretation was positive.\par \par Her metabolic panel was significant for a creatinine of 0.71, estimated GFR of 97, sodium 139 potassium 5.0 chloride 104 bicarb 22, AST 22, ALT 27.\par

## 2023-05-11 NOTE — ASSESSMENT
[Risk Reduction] : risk reduction [Medical Care Issues] : medical care issues [Drug Interactions / Side Effects] : drug interactions/side effects [FreeTextEntry1] : This 62-year-old woman with history of mixed connective tissue disease, psoriasis of the scalp leading to alopecia which has been refractory to treatment.  She is pending the start of Skyrizi by her dermatologist.  She has been diagnosed with latent tuberculosis.\par \par She has no signs of active tuberculosis infection.  She has been started on a planned course of rifampin for 4 months by her primary care doctor, Dr. SMITH.\par \par \par At this time, I think it is appropriate to continue the rifampin treatment.\par \par From an infectious disease standpoint, she can concurrently start the Skyrizi while finishing up the course of the rifampin therapy.\par \par We have analyze her medications including the proposed medication on an interaction checking application of Shayla comp, and no significant interactions found for these medications with the exception of rifampin and trazodone.\par \par It is reasonable to check routine labs including CBC, CMP, ESR, CRP.  I am aware that her mixed connective tissue, and psoriasis may cause elevations in the CRP, ESR\par \par \par she can follow-up in this office in about 2 months.\par \par see Dermatologist:\par \par [facility]\par [place] [facility]\par Fax: [phone]\par Phone: [phone]\par

## 2023-05-11 NOTE — PHYSICAL EXAM
[General Appearance - Alert] : alert [General Appearance - In No Acute Distress] : in no acute distress [Sclera] : the sclera and conjunctiva were normal [PERRL With Normal Accommodation] : pupils were equal in size, round, reactive to light [Extraocular Movements] : extraocular movements were intact [Outer Ear] : the ears and nose were normal in appearance [Oropharynx] : the oropharynx was normal with no thrush [Neck Appearance] : the appearance of the neck was normal [Neck Cervical Mass (___cm)] : no neck mass was observed [Jugular Venous Distention Increased] : there was no jugular-venous distention [Thyroid Diffuse Enlargement] : the thyroid was not enlarged [Auscultation Breath Sounds / Voice Sounds] : lungs were clear to auscultation bilaterally [Heart Rate And Rhythm] : heart rate was normal and rhythm regular [Heart Sounds] : normal S1 and S2 [Heart Sounds Gallop] : no gallops [Murmurs] : no murmurs [Heart Sounds Pericardial Friction Rub] : no pericardial rub [Full Pulse] : the pedal pulses are present [Edema] : there was no peripheral edema [Bowel Sounds] : normal bowel sounds [Abdomen Soft] : soft [Abdomen Tenderness] : non-tender [Abdomen Mass (___ Cm)] : no abdominal mass palpated [Costovertebral Angle Tenderness] : no CVA tenderness [No Palpable Adenopathy] : no palpable adenopathy [Musculoskeletal - Swelling] : no joint swelling [Nail Clubbing] : no clubbing  or cyanosis of the fingernails [Motor Tone] : muscle strength and tone were normal [Skin Color & Pigmentation] : normal skin color and pigmentation [] : no rash [Cranial Nerves] : cranial nerves 2-12 were intact [Sensation] : the sensory exam was normal to light touch and pinprick [No Focal Deficits] : no focal deficits [Oriented To Time, Place, And Person] : oriented to person, place, and time [FreeTextEntry1] : Multiple areas of alopecia on the scalp noted

## 2023-05-16 PROBLEM — F32.A DEPRESSION: Status: ACTIVE | Noted: 2023-05-11

## 2023-05-16 PROBLEM — F41.9 ANXIETY: Status: ACTIVE | Noted: 2023-05-11

## 2023-05-16 PROBLEM — Z86.69 HISTORY OF CATARACT: Status: RESOLVED | Noted: 2023-05-11 | Resolved: 2023-05-16

## 2023-05-16 PROBLEM — Z82.49 FAMILY HISTORY OF HYPERTENSION: Status: ACTIVE | Noted: 2023-05-11

## 2023-05-16 PROBLEM — Z80.1 FAMILY HISTORY OF LUNG CANCER: Status: ACTIVE | Noted: 2023-05-11

## 2023-05-16 PROBLEM — Z82.3 FAMILY HISTORY OF CEREBROVASCULAR ACCIDENT (CVA): Status: ACTIVE | Noted: 2023-05-11

## 2023-05-16 PROBLEM — G43.909 MIGRAINES: Status: ACTIVE | Noted: 2023-05-11

## 2023-05-16 PROBLEM — N95.1 MENOPAUSAL SYMPTOMS: Status: ACTIVE | Noted: 2023-05-11

## 2023-05-16 RX ORDER — ALPRAZOLAM 0.25 MG/1
0.25 TABLET ORAL
Refills: 0 | Status: ACTIVE | COMMUNITY

## 2023-05-16 RX ORDER — TRAZODONE HYDROCHLORIDE 50 MG/1
50 TABLET ORAL
Refills: 0 | Status: ACTIVE | COMMUNITY

## 2023-05-16 RX ORDER — SUMATRIPTAN SUCCINATE 6 MG/0.5ML
6 CARTRIDGE (ML) SUBCUTANEOUS
Refills: 0 | Status: ACTIVE | COMMUNITY

## 2023-07-13 ENCOUNTER — APPOINTMENT (OUTPATIENT)
Dept: INFECTIOUS DISEASE | Facility: CLINIC | Age: 63
End: 2023-07-13
Payer: COMMERCIAL

## 2023-07-13 VITALS
WEIGHT: 139 LBS | BODY MASS INDEX: 21.82 KG/M2 | HEIGHT: 67 IN | SYSTOLIC BLOOD PRESSURE: 128 MMHG | DIASTOLIC BLOOD PRESSURE: 80 MMHG | TEMPERATURE: 98 F

## 2023-07-13 PROCEDURE — 99213 OFFICE O/P EST LOW 20 MIN: CPT

## 2023-07-13 NOTE — ASSESSMENT
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  According to the patient's estimate it is going to be completed around the middle of August.\par \par \par After completing the medication course, I will check a another set of routine labs: CBC, CMP, ESR, CRP.\par \par \par No follow-up is needed with this office.  I will call the patient once the labs are available for review. [Treatment Education] : treatment education [Treatment Adherence] : treatment adherence

## 2023-07-13 NOTE — PHYSICAL EXAM
[General Appearance - Alert] : alert [General Appearance - In No Acute Distress] : in no acute distress [Sclera] : the sclera and conjunctiva were normal [PERRL With Normal Accommodation] : pupils were equal in size, round, reactive to light [Extraocular Movements] : extraocular movements were intact [Outer Ear] : the ears and nose were normal in appearance [Oropharynx] : the oropharynx was normal with no thrush [Neck Appearance] : the appearance of the neck was normal [Neck Cervical Mass (___cm)] : no neck mass was observed [Jugular Venous Distention Increased] : there was no jugular-venous distention [Thyroid Diffuse Enlargement] : the thyroid was not enlarged [Auscultation Breath Sounds / Voice Sounds] : lungs were clear to auscultation bilaterally [Heart Rate And Rhythm] : heart rate was normal and rhythm regular [Heart Sounds] : normal S1 and S2 [Heart Sounds Gallop] : no gallops [Murmurs] : no murmurs [Heart Sounds Pericardial Friction Rub] : no pericardial rub [Full Pulse] : the pedal pulses are present [Edema] : there was no peripheral edema [Bowel Sounds] : normal bowel sounds [Abdomen Soft] : soft [Abdomen Tenderness] : non-tender [Abdomen Mass (___ Cm)] : no abdominal mass palpated [Costovertebral Angle Tenderness] : no CVA tenderness [No Palpable Adenopathy] : no palpable adenopathy [Musculoskeletal - Swelling] : no joint swelling [Nail Clubbing] : no clubbing  or cyanosis of the fingernails [Motor Tone] : muscle strength and tone were normal [Skin Color & Pigmentation] : normal skin color and pigmentation [] : no rash [FreeTextEntry1] : Multiple areas of alopecia on the scalp noted [Cranial Nerves] : cranial nerves 2-12 were intact [Sensation] : the sensory exam was normal to light touch and pinprick [No Focal Deficits] : no focal deficits [Oriented To Time, Place, And Person] : oriented to person, place, and time

## 2023-07-13 NOTE — HISTORY OF PRESENT ILLNESS
[FreeTextEntry1] : This 62-year-old woman who was first seen here on May 11, 2023 for evaluation.  She has mixed connective tissue disorder, also alopecia from progressive psoriasis.  \par \par At the time when she establish care, she had already been on about 1 month of rifampin.\par She has been started on Skyrizi.\par \par She is doing well.  Labs were reviewed from the [facility] site.  Her LFTs are normal, her cell counts are normal with exception of slightly elevated MCV.  Her cholesterol levels are slightly elevated as well.\par \par Patient reports some blurry vision, related to a recent cataract surgery.  No other side effects have been reported.  She does report some dark color orange urine.\par

## 2023-07-28 ENCOUNTER — APPOINTMENT (OUTPATIENT)
Dept: RHEUMATOLOGY | Facility: CLINIC | Age: 63
End: 2023-07-28
Payer: COMMERCIAL

## 2023-07-28 VITALS
SYSTOLIC BLOOD PRESSURE: 113 MMHG | WEIGHT: 139 LBS | HEART RATE: 67 BPM | OXYGEN SATURATION: 100 % | HEIGHT: 67 IN | BODY MASS INDEX: 21.82 KG/M2 | DIASTOLIC BLOOD PRESSURE: 75 MMHG | TEMPERATURE: 98.1 F

## 2023-07-28 DIAGNOSIS — L40.9 PSORIASIS, UNSPECIFIED: ICD-10-CM

## 2023-07-28 PROCEDURE — 99214 OFFICE O/P EST MOD 30 MIN: CPT

## 2023-07-28 NOTE — HISTORY OF PRESENT ILLNESS
[FreeTextEntry1] : HISTORY: \par 62 y/o female presents as follow up for MCTD.  \par Pt reports 14 months of dizziness, intermittent pruritus, blisters on fingertips, underside of toes with burning feelings, redness of knuckles, pain in achilles, sore throat, tongue, swollen throat, numbness of mouth, throat, lips, burning chest pain, cough, mucus in throat. Pt has mild psoriasis of face and scalp - face was treated with laser therapy. Pt has seen multiple specialists for these symptoms with diagnosis only of laryngeal reflux. Pt has been treated for GERD with PPI without any improvement in the chest burning.  \par No family Hx of rheum diseases  \par \par Workup by me with KENA+, Smith+, U1-RNP+, mild known leukopenia.  \par Based on patient's joint pains, Raynaud's, rashes, labwork, pt's presentation is most consistent with MCTD. Labwork without any internal organ involvement of MCTD.  \par Pt was started on HCQ on 3/2022 but stopped due to possible side effect of unsteadiness, numbness/tingling of b/l legs, worsened irritability and depression with resolution of symptoms. \par \par Pt was tried on amlodipine 5mg/day for Raynaud's but stopped due to weight gain (not water retention) as side effect. Pt's Rayanud's symptoms currently stable.  \par Pt has scalp psoriasis refractory to topical treatment with associated patchy alopecia and would like to start systemic medication for psoriasis. \par \par INTERVAL HISTORY: \par Since last visit, pt was started on Skyrizi x 2 months, so far no improvement in psoraisis or any other symptoms.\par Pt is being treated for latent TB found with positive Quantiferon TB in 3/2023, finishing treatment in 2 weeks.\par Pt denies any cardiopulm symptoms including SOB, cough, chest pain. Pt has not returned to GI since she was previously told by GI that there is nothing more that can be done since she did not respond to medication treatment for laryngeal reflux.\par \par WORKUP:  \par Remarkable for (2/2022 - 3/2022): KENA 1:160 speckled, U1-RNP Ab 53 (nml <20), Coulter+, UPCR 0.3, 24-hr urine protein 300, HgbA1C 5.8%, ferritin 224, WBC 3.43  \par Normal/neg (2/2022): CBC (except WBC), ESR, CRP, C3, C4, SSA, SSB, RNP, Thyroid Ab, CCP, cryoglobulins, ANCAs, APLS labs, SCl-70, centromere Ab, RNA Poll III, Radha-1, Myomarker except U1-RNP, CPK, aldolase, TSH, B12, Folate, Vit D 25-OH, iron panel, LDH, SPEP, Lyme, Hep B/C, Quantiferon TB\par

## 2023-07-28 NOTE — ASSESSMENT
[FreeTextEntry1] : 64 y/o female presents as follow up for MCTD.  \par Pt reports 14 months of dizziness, intermittent pruritus, blisters on fingertips, underside of toes with burning feelings, redness of knuckles, pain in achilles, sore throat, tongue, swollen throat, numbness of mouth, throat, lips, burning chest pain, cough, mucus in throat. Pt has mild psoriasis of face and scalp - face was treated with laser therapy. Pt has seen multiple specialists for these symptoms with diagnosis only of laryngeal reflux. Pt has been treated for GERD with PPI without any improvement in the chest burning.  \par No family Hx of rheum diseases  \par \par Workup by me with KNEA+, Smith+, U1-RNP+, mild known leukopenia.  \par Based on patient's joint pains, Raynaud's, rashes, labwork, pt's presentation is most consistent with MCTD. Labwork without any internal organ involvement of MCTD.  \par Pt was started on HCQ on 3/2022 but stopped due to possible side effect of unsteadiness, numbness/tingling of b/l legs, worsened irritability and depression with resolution of symptoms. \par \par Pt was tried on amlodipine 5mg/day for Raynaud's but stopped due to weight gain (not water retention) as side effect. Pt's Rayanud's symptoms currently stable.  \par \par Pt has scalp psoriasis refractory to topical treatment with associated patchy alopecia.\par Pt has been on Skyrizi by derm x 2 months without major improvement so far.\par Pt is being treated for latent TB found with positive Quantiferon TB in 3/2023, finishing treatment in 2 weeks.\par \par Pt has not returned to GI since she was previously told by GI that there is nothing more that can be done since she did not respond to medication treatment for laryngeal reflux.\par \par - Obtain labwork to evaluate for any disease activity\par - c/w Skyrizi with dermatology, any changes in treatment per dermatology. \par - c/w latent TB treatment with ID\par - Will defer starting any new DMARDs such as MTX, AZA at this time unless there are severe symptoms or objective signs of organ involvement.  \par - Pt underwent EGD and barium swallow 2021 which was reportedly normal. Pt's CPK and aldolase normal. If significantly worsening symptoms and objective signs of esophageal involvement, will consider MTX, AZA, IVIG, etc.  \par - Pt denies any pulmonary/cardiac symptoms at this time. If any significant symptoms, will consider CXR (reportedly normal 8/2021), echo, PFTs (8/2021) in future\par - RTC 6 months for follow up \par

## 2023-09-15 ENCOUNTER — NON-APPOINTMENT (OUTPATIENT)
Age: 63
End: 2023-09-15

## 2023-09-19 ENCOUNTER — OUTPATIENT (OUTPATIENT)
Dept: OUTPATIENT SERVICES | Facility: HOSPITAL | Age: 63
LOS: 1 days | End: 2023-09-19

## 2023-09-19 DIAGNOSIS — C34.9: ICD-10-CM

## 2023-09-20 ENCOUNTER — APPOINTMENT (OUTPATIENT)
Age: 63
End: 2023-09-20
Payer: COMMERCIAL

## 2023-09-20 ENCOUNTER — NON-APPOINTMENT (OUTPATIENT)
Age: 63
End: 2023-09-20

## 2023-09-20 VITALS
SYSTOLIC BLOOD PRESSURE: 171 MMHG | TEMPERATURE: 97.2 F | HEIGHT: 67 IN | OXYGEN SATURATION: 96 % | WEIGHT: 141 LBS | HEART RATE: 85 BPM | DIASTOLIC BLOOD PRESSURE: 83 MMHG | BODY MASS INDEX: 22.13 KG/M2

## 2023-09-20 PROCEDURE — 99205 OFFICE O/P NEW HI 60 MIN: CPT

## 2023-09-20 PROCEDURE — 99215 OFFICE O/P EST HI 40 MIN: CPT

## 2023-09-20 RX ORDER — RIFAMPIN 300 MG/1
300 CAPSULE ORAL DAILY
Refills: 0 | Status: DISCONTINUED | COMMUNITY
End: 2023-09-20

## 2023-09-21 ENCOUNTER — NON-APPOINTMENT (OUTPATIENT)
Age: 63
End: 2023-09-21

## 2023-09-25 ENCOUNTER — APPOINTMENT (OUTPATIENT)
Dept: SURGICAL ONCOLOGY | Facility: CLINIC | Age: 63
End: 2023-09-25
Payer: COMMERCIAL

## 2023-09-25 VITALS
HEART RATE: 76 BPM | OXYGEN SATURATION: 99 % | BODY MASS INDEX: 22.13 KG/M2 | DIASTOLIC BLOOD PRESSURE: 89 MMHG | WEIGHT: 141 LBS | HEIGHT: 67 IN | SYSTOLIC BLOOD PRESSURE: 144 MMHG | TEMPERATURE: 97.7 F

## 2023-09-25 DIAGNOSIS — Z80.3 FAMILY HISTORY OF MALIGNANT NEOPLASM OF BREAST: ICD-10-CM

## 2023-09-25 PROCEDURE — 99204 OFFICE O/P NEW MOD 45 MIN: CPT

## 2023-09-25 RX ORDER — ELECTROLYTES/DEXTROSE
SOLUTION, ORAL ORAL
Refills: 0 | Status: ACTIVE | COMMUNITY

## 2023-09-27 ENCOUNTER — APPOINTMENT (OUTPATIENT)
Dept: NUCLEAR MEDICINE | Facility: CLINIC | Age: 63
End: 2023-09-27

## 2023-10-03 ENCOUNTER — RESULT REVIEW (OUTPATIENT)
Age: 63
End: 2023-10-03

## 2023-10-11 ENCOUNTER — OUTPATIENT (OUTPATIENT)
Dept: OUTPATIENT SERVICES | Facility: HOSPITAL | Age: 63
LOS: 1 days | End: 2023-10-11
Payer: COMMERCIAL

## 2023-10-11 VITALS
RESPIRATION RATE: 16 BRPM | HEART RATE: 75 BPM | WEIGHT: 141.1 LBS | DIASTOLIC BLOOD PRESSURE: 64 MMHG | TEMPERATURE: 99 F | HEIGHT: 66.5 IN | OXYGEN SATURATION: 99 % | SYSTOLIC BLOOD PRESSURE: 123 MMHG

## 2023-10-11 DIAGNOSIS — Z90.710 ACQUIRED ABSENCE OF BOTH CERVIX AND UTERUS: Chronic | ICD-10-CM

## 2023-10-11 DIAGNOSIS — Z98.890 OTHER SPECIFIED POSTPROCEDURAL STATES: Chronic | ICD-10-CM

## 2023-10-11 DIAGNOSIS — Z98.1 ARTHRODESIS STATUS: Chronic | ICD-10-CM

## 2023-10-11 DIAGNOSIS — Z90.89 ACQUIRED ABSENCE OF OTHER ORGANS: Chronic | ICD-10-CM

## 2023-10-11 DIAGNOSIS — Z98.41 CATARACT EXTRACTION STATUS, RIGHT EYE: Chronic | ICD-10-CM

## 2023-10-11 DIAGNOSIS — Z01.818 ENCOUNTER FOR OTHER PREPROCEDURAL EXAMINATION: ICD-10-CM

## 2023-10-11 LAB
ABO RH CONFIRMATION: SIGNIFICANT CHANGE UP
ANION GAP SERPL CALC-SCNC: 2 MMOL/L — LOW (ref 5–17)
APPEARANCE UR: CLEAR — SIGNIFICANT CHANGE UP
APTT BLD: 34.9 SEC — SIGNIFICANT CHANGE UP (ref 24.5–35.6)
BACTERIA # UR AUTO: NEGATIVE /HPF — SIGNIFICANT CHANGE UP
BASOPHILS # BLD AUTO: 0.03 K/UL — SIGNIFICANT CHANGE UP (ref 0–0.2)
BASOPHILS NFR BLD AUTO: 1.1 % — SIGNIFICANT CHANGE UP (ref 0–2)
BILIRUB UR-MCNC: NEGATIVE — SIGNIFICANT CHANGE UP
BLD GP AB SCN SERPL QL: SIGNIFICANT CHANGE UP
BUN SERPL-MCNC: 17 MG/DL — SIGNIFICANT CHANGE UP (ref 7–23)
CALCIUM SERPL-MCNC: 9 MG/DL — SIGNIFICANT CHANGE UP (ref 8.5–10.1)
CHLORIDE SERPL-SCNC: 108 MMOL/L — SIGNIFICANT CHANGE UP (ref 96–108)
CO2 SERPL-SCNC: 29 MMOL/L — SIGNIFICANT CHANGE UP (ref 22–31)
COLOR SPEC: YELLOW — SIGNIFICANT CHANGE UP
CREAT SERPL-MCNC: 1.07 MG/DL — SIGNIFICANT CHANGE UP (ref 0.5–1.3)
DIFF PNL FLD: NEGATIVE — SIGNIFICANT CHANGE UP
EGFR: 59 ML/MIN/1.73M2 — LOW
EOSINOPHIL # BLD AUTO: 0.07 K/UL — SIGNIFICANT CHANGE UP (ref 0–0.5)
EOSINOPHIL NFR BLD AUTO: 2.5 % — SIGNIFICANT CHANGE UP (ref 0–6)
GLUCOSE SERPL-MCNC: 101 MG/DL — HIGH (ref 70–99)
GLUCOSE UR QL: NEGATIVE MG/DL — SIGNIFICANT CHANGE UP
HCT VFR BLD CALC: 34 % — LOW (ref 34.5–45)
HGB BLD-MCNC: 12.1 G/DL — SIGNIFICANT CHANGE UP (ref 11.5–15.5)
IMM GRANULOCYTES NFR BLD AUTO: 0.4 % — SIGNIFICANT CHANGE UP (ref 0–0.9)
INR BLD: 0.95 RATIO — SIGNIFICANT CHANGE UP (ref 0.85–1.18)
KETONES UR-MCNC: NEGATIVE MG/DL — SIGNIFICANT CHANGE UP
LEUKOCYTE ESTERASE UR-ACNC: ABNORMAL
LYMPHOCYTES # BLD AUTO: 1.06 K/UL — SIGNIFICANT CHANGE UP (ref 1–3.3)
LYMPHOCYTES # BLD AUTO: 37.9 % — SIGNIFICANT CHANGE UP (ref 13–44)
MACROCYTES BLD QL: SLIGHT — SIGNIFICANT CHANGE UP
MANUAL SMEAR VERIFICATION: SIGNIFICANT CHANGE UP
MCHC RBC-ENTMCNC: 35.6 GM/DL — SIGNIFICANT CHANGE UP (ref 32–36)
MCHC RBC-ENTMCNC: 35.7 PG — HIGH (ref 27–34)
MCV RBC AUTO: 100.3 FL — HIGH (ref 80–100)
MONOCYTES # BLD AUTO: 0.3 K/UL — SIGNIFICANT CHANGE UP (ref 0–0.9)
MONOCYTES NFR BLD AUTO: 10.7 % — SIGNIFICANT CHANGE UP (ref 2–14)
NEUTROPHILS # BLD AUTO: 1.33 K/UL — LOW (ref 1.8–7.4)
NEUTROPHILS NFR BLD AUTO: 47.4 % — SIGNIFICANT CHANGE UP (ref 43–77)
NITRITE UR-MCNC: NEGATIVE — SIGNIFICANT CHANGE UP
PH UR: 7 — SIGNIFICANT CHANGE UP (ref 5–8)
PLAT MORPH BLD: NORMAL — SIGNIFICANT CHANGE UP
PLATELET # BLD AUTO: 255 K/UL — SIGNIFICANT CHANGE UP (ref 150–400)
POTASSIUM SERPL-MCNC: 4 MMOL/L — SIGNIFICANT CHANGE UP (ref 3.5–5.3)
POTASSIUM SERPL-SCNC: 4 MMOL/L — SIGNIFICANT CHANGE UP (ref 3.5–5.3)
PROT UR-MCNC: NEGATIVE MG/DL — SIGNIFICANT CHANGE UP
PROTHROM AB SERPL-ACNC: 10.7 SEC — SIGNIFICANT CHANGE UP (ref 9.5–13)
RBC # BLD: 3.39 M/UL — LOW (ref 3.8–5.2)
RBC # FLD: 11.3 % — SIGNIFICANT CHANGE UP (ref 10.3–14.5)
RBC BLD AUTO: ABNORMAL
RBC CASTS # UR COMP ASSIST: 0 /HPF — SIGNIFICANT CHANGE UP (ref 0–4)
SODIUM SERPL-SCNC: 139 MMOL/L — SIGNIFICANT CHANGE UP (ref 135–145)
SP GR SPEC: 1.01 — SIGNIFICANT CHANGE UP (ref 1–1.03)
SQUAMOUS # UR AUTO: 0 /HPF — SIGNIFICANT CHANGE UP (ref 0–5)
UROBILINOGEN FLD QL: 0.2 MG/DL — SIGNIFICANT CHANGE UP (ref 0.2–1)
WBC # BLD: 2.8 K/UL — LOW (ref 3.8–10.5)
WBC # FLD AUTO: 2.8 K/UL — LOW (ref 3.8–10.5)
WBC UR QL: 0 /HPF — SIGNIFICANT CHANGE UP (ref 0–5)

## 2023-10-11 PROCEDURE — 93010 ELECTROCARDIOGRAM REPORT: CPT

## 2023-10-11 PROCEDURE — 86901 BLOOD TYPING SEROLOGIC RH(D): CPT

## 2023-10-11 PROCEDURE — 85025 COMPLETE CBC W/AUTO DIFF WBC: CPT

## 2023-10-11 PROCEDURE — 93005 ELECTROCARDIOGRAM TRACING: CPT

## 2023-10-11 PROCEDURE — 85610 PROTHROMBIN TIME: CPT

## 2023-10-11 PROCEDURE — 85730 THROMBOPLASTIN TIME PARTIAL: CPT

## 2023-10-11 PROCEDURE — 99214 OFFICE O/P EST MOD 30 MIN: CPT | Mod: 25

## 2023-10-11 PROCEDURE — 81001 URINALYSIS AUTO W/SCOPE: CPT

## 2023-10-11 PROCEDURE — 36415 COLL VENOUS BLD VENIPUNCTURE: CPT

## 2023-10-11 PROCEDURE — 86900 BLOOD TYPING SEROLOGIC ABO: CPT

## 2023-10-11 PROCEDURE — 80048 BASIC METABOLIC PNL TOTAL CA: CPT

## 2023-10-11 PROCEDURE — 86850 RBC ANTIBODY SCREEN: CPT

## 2023-10-11 NOTE — H&P PST ADULT - NSICDXFAMILYHX_GEN_ALL_CORE_FT
FAMILY HISTORY:  Father  Still living? No  Family history of cerebral hemorrhage, Age at diagnosis: Age Unknown    Mother  Still living? No  FH: lung cancer, Age at diagnosis: Age Unknown    Aunt  Still living? Yes, Estimated age: Age Unknown  FH: breast cancer, Age at diagnosis: Age Unknown

## 2023-10-11 NOTE — H&P PST ADULT - HISTORY OF PRESENT ILLNESS
[age] y.o WD, WN female presents to PST with hx of a change in a right lower leg mole. She followed with Dermatology , biopsy positive melanoma. Her hx is significant for Anxiety/Depression, Migraines, Psoriasis and mixed connective tissue disease. She has followed with surgeon and scheduled for Wide Local Excision of Malignant Melanoma of Right Lower Extremity with [place] Node Mapping and Biopsy, possible skin graft

## 2023-10-11 NOTE — H&P PST ADULT - NSANTHOSAYNRD_GEN_A_CORE
No. [place] screening performed.  STOP BANG Legend: 0-2 = LOW Risk; 3-4 = INTERMEDIATE Risk; 5-8 = HIGH Risk

## 2023-10-11 NOTE — H&P PST ADULT - NEGATIVE GENERAL GENITOURINARY SYMPTOMS
For next 24 hours keep bandaid on and no shower or tub baths  
no hematuria/no bladder infections/no dysuria/normal urinary frequency

## 2023-10-11 NOTE — H&P PST ADULT - COMMENTS
Patient states her cervix was removed when she had her hysterectomy, last Pap in 5/2023 atypical cells- followed with gyn/oncology no significant findings

## 2023-10-11 NOTE — H&P PST ADULT - NSICDXPASTMEDICALHX_GEN_ALL_CORE_FT
PAST MEDICAL HISTORY:  Anxiety and depression     Cataract, right eye     Laryngopharyngeal reflux (LPR)     Melanoma     Migraines     Mixed connective tissue disease     Psoriasis     Raynaud's syndrome     TB (tuberculosis), treated

## 2023-10-11 NOTE — H&P PST ADULT - NSICDXPASTSURGICALHX_GEN_ALL_CORE_FT
PAST SURGICAL HISTORY:  H/O colonoscopy     H/O: hysterectomy     History of breast lift     History of esophagogastroduodenoscopy (EGD)     History of lumbar spinal fusion     History of right cataract surgery     History of tonsillectomy

## 2023-10-12 DIAGNOSIS — Z01.818 ENCOUNTER FOR OTHER PREPROCEDURAL EXAMINATION: ICD-10-CM

## 2023-10-13 ENCOUNTER — RESULT REVIEW (OUTPATIENT)
Age: 63
End: 2023-10-13

## 2023-10-13 ENCOUNTER — TRANSCRIPTION ENCOUNTER (OUTPATIENT)
Age: 63
End: 2023-10-13

## 2023-10-13 ENCOUNTER — OUTPATIENT (OUTPATIENT)
Dept: INPATIENT UNIT | Facility: HOSPITAL | Age: 63
LOS: 1 days | Discharge: HOME | End: 2023-10-13
Payer: COMMERCIAL

## 2023-10-13 ENCOUNTER — APPOINTMENT (OUTPATIENT)
Dept: SURGICAL ONCOLOGY | Facility: HOSPITAL | Age: 63
End: 2023-10-13

## 2023-10-13 VITALS
RESPIRATION RATE: 16 BRPM | TEMPERATURE: 97 F | OXYGEN SATURATION: 99 % | SYSTOLIC BLOOD PRESSURE: 112 MMHG | DIASTOLIC BLOOD PRESSURE: 68 MMHG | HEART RATE: 72 BPM

## 2023-10-13 VITALS
DIASTOLIC BLOOD PRESSURE: 73 MMHG | SYSTOLIC BLOOD PRESSURE: 113 MMHG | TEMPERATURE: 98 F | HEIGHT: 67 IN | HEART RATE: 62 BPM | WEIGHT: 139.99 LBS | RESPIRATION RATE: 16 BRPM | OXYGEN SATURATION: 100 %

## 2023-10-13 DIAGNOSIS — Z98.890 OTHER SPECIFIED POSTPROCEDURAL STATES: Chronic | ICD-10-CM

## 2023-10-13 DIAGNOSIS — C43.71 MALIGNANT MELANOMA OF RIGHT LOWER LIMB, INCLUDING HIP: ICD-10-CM

## 2023-10-13 DIAGNOSIS — Z98.1 ARTHRODESIS STATUS: Chronic | ICD-10-CM

## 2023-10-13 DIAGNOSIS — Z98.41 CATARACT EXTRACTION STATUS, RIGHT EYE: Chronic | ICD-10-CM

## 2023-10-13 DIAGNOSIS — Z90.89 ACQUIRED ABSENCE OF OTHER ORGANS: Chronic | ICD-10-CM

## 2023-10-13 DIAGNOSIS — Z90.710 ACQUIRED ABSENCE OF BOTH CERVIX AND UTERUS: Chronic | ICD-10-CM

## 2023-10-13 PROCEDURE — 78195 LYMPH SYSTEM IMAGING: CPT

## 2023-10-13 PROCEDURE — 38900 IO MAP OF SENT LYMPH NODE: CPT

## 2023-10-13 PROCEDURE — 88341 IMHCHEM/IMCYTCHM EA ADD ANTB: CPT | Mod: 26

## 2023-10-13 PROCEDURE — 88341 IMHCHEM/IMCYTCHM EA ADD ANTB: CPT

## 2023-10-13 PROCEDURE — 78195 LYMPH SYSTEM IMAGING: CPT | Mod: 26

## 2023-10-13 PROCEDURE — 88305 TISSUE EXAM BY PATHOLOGIST: CPT

## 2023-10-13 PROCEDURE — 38531 OPEN BX/EXC INGUINOFEM NODES: CPT

## 2023-10-13 PROCEDURE — A9520: CPT

## 2023-10-13 PROCEDURE — 88342 IMHCHEM/IMCYTCHM 1ST ANTB: CPT

## 2023-10-13 PROCEDURE — 88305 TISSUE EXAM BY PATHOLOGIST: CPT | Mod: 26

## 2023-10-13 PROCEDURE — 11606 EXC TR-EXT MAL+MARG >4 CM: CPT

## 2023-10-13 PROCEDURE — 88342 IMHCHEM/IMCYTCHM 1ST ANTB: CPT | Mod: 26

## 2023-10-13 RX ORDER — SODIUM CHLORIDE 9 MG/ML
1000 INJECTION, SOLUTION INTRAVENOUS
Refills: 0 | Status: DISCONTINUED | OUTPATIENT
Start: 2023-10-13 | End: 2023-10-13

## 2023-10-13 RX ORDER — ALPRAZOLAM 0.25 MG
1 TABLET ORAL
Refills: 0 | DISCHARGE

## 2023-10-13 RX ORDER — SUMATRIPTAN SUCCINATE 4 MG/.5ML
0 INJECTION, SOLUTION SUBCUTANEOUS
Refills: 0 | DISCHARGE

## 2023-10-13 RX ORDER — BUPROPION HYDROCHLORIDE 150 MG/1
1 TABLET, EXTENDED RELEASE ORAL
Refills: 0 | DISCHARGE

## 2023-10-13 RX ORDER — OXYCODONE HYDROCHLORIDE 5 MG/1
5 TABLET ORAL ONCE
Refills: 0 | Status: DISCONTINUED | OUTPATIENT
Start: 2023-10-13 | End: 2023-10-13

## 2023-10-13 RX ORDER — TRAZODONE HCL 50 MG
0 TABLET ORAL
Refills: 0 | DISCHARGE

## 2023-10-13 RX ORDER — FENTANYL CITRATE 50 UG/ML
50 INJECTION INTRAVENOUS
Refills: 0 | Status: DISCONTINUED | OUTPATIENT
Start: 2023-10-13 | End: 2023-10-13

## 2023-10-13 RX ORDER — ONDANSETRON 8 MG/1
4 TABLET, FILM COATED ORAL ONCE
Refills: 0 | Status: DISCONTINUED | OUTPATIENT
Start: 2023-10-13 | End: 2023-10-13

## 2023-10-13 RX ORDER — SECUKINUMAB 150 MG/ML
300 INJECTION SUBCUTANEOUS
Refills: 0 | DISCHARGE

## 2023-10-13 RX ORDER — FENTANYL CITRATE 50 UG/ML
25 INJECTION INTRAVENOUS
Refills: 0 | Status: DISCONTINUED | OUTPATIENT
Start: 2023-10-13 | End: 2023-10-13

## 2023-10-13 RX ADMIN — OXYCODONE HYDROCHLORIDE 5 MILLIGRAM(S): 5 TABLET ORAL at 20:00

## 2023-10-13 RX ADMIN — FENTANYL CITRATE 50 MICROGRAM(S): 50 INJECTION INTRAVENOUS at 20:00

## 2023-10-13 NOTE — ASU DISCHARGE PLAN (ADULT/PEDIATRIC) - CARE PROVIDER_API CALL
Franky Rowell  Surgery  [address] [facility] [facility], [address]  [place] [address]  Phone: [phone]  Fax: [phone]  Established Patient  Follow Up Time: 1 week

## 2023-10-13 NOTE — BRIEF OPERATIVE NOTE - NSICDXBRIEFPROCEDURE_GEN_ALL_CORE_FT
PROCEDURES:  Wide excision, melanoma, lower extremity, with lymph node mapping and lymphadenectomy 13-Oct-2023 19:27:19  Argentina Lewis

## 2023-10-13 NOTE — ASU DISCHARGE PLAN (ADULT/PEDIATRIC) - PATIENT EDUCATION MATERIALS PROVIED
TORO drsg/Pre-printed instructions given for nerve block/Pre-printed instructions given for other (specify)

## 2023-10-13 NOTE — BRIEF OPERATIVE NOTE - OPERATION/FINDINGS
Wide excision of right lower extremity melanoma with sentinal lymph node excision, confirmed with nuclear tracer.

## 2023-10-13 NOTE — ASU DISCHARGE PLAN (ADULT/PEDIATRIC) - ASU DC SPECIAL INSTRUCTIONSFT
Please keep dressing in place until follow up appointment. The groin wound is closed with surgical glue; you may shower normally with glue and lower extremity TORO dressing. TORO machine should have blinking green light; if you see a red light or the dressing is saturated, please call Dr. Rowell. Please take over the counter pain medications as needed.

## 2023-10-13 NOTE — ASU PATIENT PROFILE, ADULT - FALL HARM RISK - UNIVERSAL INTERVENTIONS
Bed in lowest position, wheels locked, appropriate side rails in place/Call bell, personal items and telephone in reach/Instruct patient to call for assistance before getting out of bed or chair/Non-slip footwear when patient is out of bed/[place] to call system/Physically safe environment - no spills, clutter or unnecessary equipment/Purposeful Proactive Rounding/Room/bathroom lighting operational, light cord in reach

## 2023-10-16 PROBLEM — H26.9 UNSPECIFIED CATARACT: Chronic | Status: ACTIVE | Noted: 2023-10-11

## 2023-10-16 PROBLEM — K21.9 GASTRO-ESOPHAGEAL REFLUX DISEASE WITHOUT ESOPHAGITIS: Chronic | Status: ACTIVE | Noted: 2023-10-11

## 2023-10-16 PROBLEM — C43.9 MALIGNANT MELANOMA OF SKIN, UNSPECIFIED: Chronic | Status: ACTIVE | Noted: 2023-10-11

## 2023-10-16 PROBLEM — G43.909 MIGRAINE, UNSPECIFIED, NOT INTRACTABLE, WITHOUT STATUS MIGRAINOSUS: Chronic | Status: ACTIVE | Noted: 2023-10-11

## 2023-10-16 PROBLEM — L40.9 PSORIASIS, UNSPECIFIED: Chronic | Status: ACTIVE | Noted: 2023-10-11

## 2023-10-18 ENCOUNTER — APPOINTMENT (OUTPATIENT)
Dept: SURGICAL ONCOLOGY | Facility: CLINIC | Age: 63
End: 2023-10-18
Payer: COMMERCIAL

## 2023-10-18 VITALS
HEIGHT: 66 IN | HEART RATE: 72 BPM | BODY MASS INDEX: 22.02 KG/M2 | DIASTOLIC BLOOD PRESSURE: 76 MMHG | SYSTOLIC BLOOD PRESSURE: 111 MMHG | WEIGHT: 137 LBS | OXYGEN SATURATION: 99 %

## 2023-10-18 DIAGNOSIS — C43.71 MALIGNANT MELANOMA OF RIGHT LOWER LIMB, INCLUDING HIP: ICD-10-CM

## 2023-10-18 PROCEDURE — 99024 POSTOP FOLLOW-UP VISIT: CPT

## 2023-10-20 PROBLEM — F41.9 ANXIETY DISORDER, UNSPECIFIED: Chronic | Status: ACTIVE | Noted: 2023-10-11

## 2023-10-20 PROBLEM — M35.1 OTHER OVERLAP SYNDROMES: Chronic | Status: ACTIVE | Noted: 2023-10-11

## 2023-10-20 PROBLEM — I73.00 RAYNAUD'S SYNDROME WITHOUT GANGRENE: Chronic | Status: ACTIVE | Noted: 2023-10-11

## 2023-10-20 PROBLEM — A15.9 RESPIRATORY TUBERCULOSIS UNSPECIFIED: Chronic | Status: ACTIVE | Noted: 2023-10-11

## 2023-10-23 ENCOUNTER — APPOINTMENT (OUTPATIENT)
Dept: ULTRASOUND IMAGING | Facility: CLINIC | Age: 63
End: 2023-10-23

## 2023-10-23 ENCOUNTER — APPOINTMENT (OUTPATIENT)
Dept: MAMMOGRAPHY | Facility: CLINIC | Age: 63
End: 2023-10-23
Payer: COMMERCIAL

## 2023-10-23 PROCEDURE — 77067 SCR MAMMO BI INCL CAD: CPT

## 2023-10-23 PROCEDURE — 77063 BREAST TOMOSYNTHESIS BI: CPT

## 2023-10-24 ENCOUNTER — NON-APPOINTMENT (OUTPATIENT)
Age: 63
End: 2023-10-24

## 2023-10-24 LAB
SURGICAL PATHOLOGY STUDY: SIGNIFICANT CHANGE UP
SURGICAL PATHOLOGY STUDY: SIGNIFICANT CHANGE UP

## 2023-10-25 ENCOUNTER — NON-APPOINTMENT (OUTPATIENT)
Age: 63
End: 2023-10-25

## 2023-11-06 ENCOUNTER — APPOINTMENT (OUTPATIENT)
Dept: MRI IMAGING | Facility: CLINIC | Age: 63
End: 2023-11-06
Payer: COMMERCIAL

## 2023-11-06 PROCEDURE — 70553 MRI BRAIN STEM W/O & W/DYE: CPT

## 2024-01-10 ENCOUNTER — NON-APPOINTMENT (OUTPATIENT)
Age: 64
End: 2024-01-10

## 2024-01-15 ENCOUNTER — APPOINTMENT (OUTPATIENT)
Dept: RHEUMATOLOGY | Facility: CLINIC | Age: 64
End: 2024-01-15
Payer: COMMERCIAL

## 2024-01-15 VITALS
TEMPERATURE: 97.2 F | HEART RATE: 74 BPM | BODY MASS INDEX: 22.5 KG/M2 | OXYGEN SATURATION: 98 % | HEIGHT: 66 IN | DIASTOLIC BLOOD PRESSURE: 71 MMHG | SYSTOLIC BLOOD PRESSURE: 148 MMHG | WEIGHT: 140 LBS

## 2024-01-15 DIAGNOSIS — M35.1 OTHER OVERLAP SYNDROMES: ICD-10-CM

## 2024-01-15 DIAGNOSIS — I73.00 RAYNAUD'S SYNDROME W/OUT GANGRENE: ICD-10-CM

## 2024-01-15 PROCEDURE — G2211 COMPLEX E/M VISIT ADD ON: CPT

## 2024-01-15 PROCEDURE — 99214 OFFICE O/P EST MOD 30 MIN: CPT

## 2024-01-15 NOTE — HISTORY OF PRESENT ILLNESS
[FreeTextEntry1] : HISTORY:  62 y/o female presents as follow up for SLE, MCTD.  Pt reports 14 months of dizziness, intermittent pruritus, blisters on fingertips, underside of toes with burning feelings, redness of knuckles, pain in achilles, sore throat, tongue, swollen throat, numbness of mouth, throat, lips, burning chest pain, cough, mucus in throat. Pt has mild psoriasis of face and scalp - face was treated with laser therapy. Pt has seen multiple specialists for these symptoms with diagnosis only of laryngeal reflux. Pt has been treated for GERD with PPI without any improvement in the chest burning.  No family Hx of rheum diseases   Workup by me with KENA+, Smith+, U1-RNP+, mild known leukopenia.  Based on patient's joint pains, Raynaud's, rashes, labwork, pt's presentation is most consistent with SLE and MCTD. Labwork without any internal organ involvement.  Pt was started on HCQ on 3/2022 but stopped due to possible side effect of unsteadiness, numbness/tingling of b/l legs, worsened irritability and depression with resolution of symptoms.   Pt was tried on amlodipine 5mg/day for Raynaud's but stopped due to weight gain (not water retention) as side effect. Pt's Rayanud's symptoms currently stable.   Pt has scalp psoriasis refractory to topical treatment with associated patchy alopecia.   INTERVAL HISTORY:  Since last visit, pt's Skyrizi has been changed to Cosentyx since ~9/2023 without any improvement of her scalp psoriasis. Pt was seen by a different dermatologist and had punch biopsy of scalp rash showing interface dermatitis. At this time, pt no longer has diagnosis of PsA and Cosentyx was discontinued. Pt completed full TB treatment.  WORKUP:  Remarkable for (2/2022 - 3/2022): KENA 1:160 speckled, U1-RNP Ab 53 (nml <20), Coulter+, UPCR 0.3, 24-hr urine protein 300, HgbA1C 5.8%, ferritin 224, WBC 3.43  Normal/neg (2/2022): CBC (except WBC), ESR, CRP, C3, C4, SSA, SSB, RNP, Thyroid Ab, CCP, cryoglobulins, ANCAs, APLS labs, SCl-70, centromere Ab, RNA Poll III, Radha-1, Myomarker except U1-RNP, CPK, aldolase, TSH, B12, Folate, Vit D 25-OH, iron panel, LDH, SPEP, Lyme, Hep B/C  Punch biopsy scalp rash (12/2023): Superficial and deep perivascular and periadnexal lymphoplasmacytic inflammation with interface changes.

## 2024-01-15 NOTE — ASSESSMENT
[FreeTextEntry1] : 62 y/o female presents as follow up for SLE, MCTD.  Pt reports 14 months of dizziness, intermittent pruritus, blisters on fingertips, underside of toes with burning feelings, redness of knuckles, pain in achilles, sore throat, tongue, swollen throat, numbness of mouth, throat, lips, burning chest pain, cough, mucus in throat. Pt has mild psoriasis of face and scalp - face was treated with laser therapy. Pt has seen multiple specialists for these symptoms with diagnosis only of laryngeal reflux. Pt has been treated for GERD with PPI without any improvement in the chest burning.  No family Hx of rheum diseases   Workup by me with KENA+, Smith+, U1-RNP+, mild known leukopenia.  Based on patient's joint pains, Raynaud's, rashes, labwork, pt's presentation is most consistent with SLE and MCTD. Labwork without any internal organ involvement.  Pt was started on HCQ on 3/2022 but stopped due to possible side effect of unsteadiness, numbness/tingling of b/l legs, worsened irritability and depression with resolution of symptoms.   Pt was tried on amlodipine 5mg/day for Raynaud's but stopped due to weight gain (not water retention) as side effect. Pt's Rayanud's symptoms currently stable.   Pt has had diagnosis of scalp psoriasis with associated patchy alopecia refractory to topical treatments, Skyrizi, and Cosentyx. Pt had skin biopsy on 12/2023 which showed interface dermatitis. Pt no longer has diagnosis of psoriasis and the rash is considered to be part of her underlying autoimimune disease SLE/MCTD. Patient needs step up in therapy for her underlying SLE/MCTD given pt's uncontrolled joint, skin, constitutional symptoms with side effects on HCQ in the past.  Pt completed full treatment for latent TB in 2023.  - Rx Benlysta 200mg SQ QWeekly. Advised to watch for immunosuppression, injection side reactions. - Pen injector being ordered since pt unable to maneuver syringe and vial due to hand pains. - Last Hep B/C negative 3/2023. Pt is s/p latent TB treatment with ID in 2023.  - Rx PDN taper 30mg ->>> 5mg/day until seen by me next. Will D/C on next visit. - Pt underwent EGD and barium swallow 2021 which was reportedly normal. Pt's CPK and aldolase normal. If significantly worsening symptoms and objective signs of esophageal involvement, will consider MTX, AZA, IVIG, etc.  - Pt denies any pulmonary/cardiac symptoms at this time. If any significant symptoms, will consider CXR (reportedly normal 8/2021), echo, PFTs (8/2021) in future  - RTC 2 months for follow up

## 2024-01-15 NOTE — PHYSICAL EXAM
[TextEntry] : GENERAL: Appears in no acute distress HEENT: EOMI, PERRLA. No conjunctival erythema. Moist mucous membranes. No nasopharyngeal ulcers NECK: Supple, no cervical lymphadenopathy, no thyromegaly CARDIOVASCULAR: RRR. S1, S2 auscultated. No murmurs or rubs. PULMONARY: Clear to auscultation b/l, no wheezes, rales, or crackles ABDOMINAL: Soft, nontender, nondistended. Bowel sounds present. No organomegaly. MSK: No active synovitis, swelling, erythema, or warmth. No joint tenderness to palpation. No deformities. Normal ROM of neck, back, b/l upper and lower extremities. SKIN: Scalp patchy and severe alopecia with mild erythema and scabbing Erythema of b/l MCPs, fingertips. No active digital tip pits or ulcers (2/2022: Normal nailfold capillaries on capillaroscopy) No skin thickening NEURO: No focal deficits. PSYCH: AAOx3. Normal affect and thought process.

## 2024-01-18 ENCOUNTER — NON-APPOINTMENT (OUTPATIENT)
Age: 64
End: 2024-01-18

## 2024-03-15 ENCOUNTER — APPOINTMENT (OUTPATIENT)
Dept: RHEUMATOLOGY | Facility: CLINIC | Age: 64
End: 2024-03-15
Payer: COMMERCIAL

## 2024-03-15 VITALS
OXYGEN SATURATION: 98 % | DIASTOLIC BLOOD PRESSURE: 81 MMHG | TEMPERATURE: 96.6 F | WEIGHT: 143 LBS | SYSTOLIC BLOOD PRESSURE: 139 MMHG | HEIGHT: 66 IN | BODY MASS INDEX: 22.98 KG/M2 | HEART RATE: 69 BPM

## 2024-03-15 DIAGNOSIS — M35.9 SYSTEMIC INVOLVEMENT OF CONNECTIVE TISSUE, UNSPECIFIED: ICD-10-CM

## 2024-03-15 DIAGNOSIS — Z22.7 LATENT TUBERCULOSIS: ICD-10-CM

## 2024-03-15 PROCEDURE — G2211 COMPLEX E/M VISIT ADD ON: CPT

## 2024-03-15 PROCEDURE — 36415 COLL VENOUS BLD VENIPUNCTURE: CPT

## 2024-03-15 PROCEDURE — 99214 OFFICE O/P EST MOD 30 MIN: CPT

## 2024-03-15 NOTE — PHYSICAL EXAM
[TextEntry] : GENERAL: Appears in no acute distress HEENT: EOMI, PERRLA. No conjunctival erythema. Moist mucous membranes. No nasopharyngeal ulcers NECK: Supple, no cervical lymphadenopathy, no thyromegaly CARDIOVASCULAR: RRR. S1, S2 auscultated. No murmurs or rubs. PULMONARY: Clear to auscultation b/l, no wheezes, rales, or crackles ABDOMINAL: Soft, nontender, nondistended. Bowel sounds present. No organomegaly. MSK: No active synovitis, swelling, erythema, or warmth. No joint tenderness to palpation. No deformities. Normal ROM of neck, back, b/l upper and lower extremities. SKIN: Scalp patchy and severe alopecia with mild erythema and scabbing - unchanged since last visit Erythema of b/l MCPs, fingertips. No active digital tip pits or ulcers (2/2022: Normal nailfold capillaries on capillaroscopy) No skin thickening NEURO: No focal deficits. PSYCH: AAOx3. Normal affect and thought process.

## 2024-03-15 NOTE — HISTORY OF PRESENT ILLNESS
[FreeTextEntry1] : HISTORY:  62 y/o female presents as follow up for SLE, MCTD.  Pt reports 14 months of dizziness, intermittent pruritus, blisters on fingertips, underside of toes with burning feelings, redness of knuckles, pain in achilles, sore throat, tongue, swollen throat, numbness of mouth, throat, lips, burning chest pain, cough, mucus in throat. Pt has mild psoriasis of face and scalp - face was treated with laser therapy. Pt has seen multiple specialists for these symptoms with diagnosis only of laryngeal reflux. Pt has been treated for GERD with PPI without any improvement in the chest burning.  No family Hx of rheum diseases   Workup by me with KENA+, Smith+, U1-RNP+, mild known leukopenia.  Based on patient's joint pains, Raynaud's, rashes, labwork, pt's presentation is most consistent with SLE and MCTD. Labwork without any internal organ involvement.  Pt was started on HCQ on 3/2022 but stopped due to possible side effect of unsteadiness, numbness/tingling of b/l legs, worsened irritability and depression with resolution of symptoms.   Pt was tried on amlodipine 5mg/day for Raynaud's but stopped due to weight gain (not water retention) as side effect. Pt's Rayanud's symptoms currently stable.   Pt has had diagnosis of scalp psoriasis with associated patchy alopecia refractory to topical treatments, Skyrizi, and Cosentyx. Pt had skin biopsy on 12/2023 which showed interface dermatitis. Pt no longer has diagnosis of psoriasis and the rash is considered to be part of her underlying autoimimune disease SLE/MCTD.  Patient needs step up in therapy for her underlying SLE/MCTD given pt's uncontrolled joint, skin, constitutional symptoms with side effects on HCQ in the past.  Pt completed full treatment for latent TB in 2023.   INTERVAL HISTORY:  Pt has been on Benlysta since 1/2024 without any improvement in her symptoms including rashes so far. Pt was given PDN taper from 60mg -> off with improvement of her rash and itching, but stopped due to weight gain and since lower dose did not help at all.  WORKUP:  Remarkable for (2/2022 - 3/2022): KENA 1:160 speckled, U1-RNP Ab 53 (nml <20), Coulter+, UPCR 0.3, 24-hr urine protein 300, HgbA1C 5.8%, ferritin 224, WBC 3.43  Normal/neg (2/2022): CBC (except WBC), ESR, CRP, C3, C4, SSA, SSB, RNP, Thyroid Ab, CCP, cryoglobulins, ANCAs, APLS labs, SCl-70, centromere Ab, RNA Poll III, Radha-1, Myomarker except U1-RNP, CPK, aldolase, TSH, B12, Folate, Vit D 25-OH, iron panel, LDH, SPEP, Lyme, Hep B/C  Punch biopsy scalp rash (12/2023): Superficial and deep perivascular and periadnexal lymphoplasmacytic inflammation with interface changes.

## 2024-03-15 NOTE — ASSESSMENT
[FreeTextEntry1] : 62 y/o female presents as follow up for SLE, MCTD.  Pt reports 14 months of dizziness, intermittent pruritus, blisters on fingertips, underside of toes with burning feelings, redness of knuckles, pain in achilles, sore throat, tongue, swollen throat, numbness of mouth, throat, lips, burning chest pain, cough, mucus in throat. Pt has mild psoriasis of face and scalp - face was treated with laser therapy. Pt has seen multiple specialists for these symptoms with diagnosis only of laryngeal reflux. Pt has been treated for GERD with PPI without any improvement in the chest burning.  No family Hx of rheum diseases   Workup by me with KENA+, Smith+, U1-RNP+, mild known leukopenia.  Based on patient's joint pains, Raynaud's, rashes, labwork, pt's presentation is most consistent with SLE and MCTD. Labwork without any internal organ involvement.  Pt was started on HCQ on 3/2022 but stopped due to possible side effect of unsteadiness, numbness/tingling of b/l legs, worsened irritability and depression with resolution of symptoms.   Pt was tried on amlodipine 5mg/day for Raynaud's but stopped due to weight gain (not water retention) as side effect. Pt's Raynaud's symptoms currently stable.   Pt has had diagnosis of scalp psoriasis with associated patchy alopecia refractory to topical treatments, Skyrizi, and Cosentyx. Pt had skin biopsy on 12/2023 which showed interface dermatitis. Pt no longer has diagnosis of psoriasis and the rash is considered to be part of her underlying autoimmune disease SLE/MCTD.  Patient needs step up in therapy for her underlying SLE/MCTD given pt's uncontrolled joint, skin, constitutional symptoms with side effects on HCQ in the past.  Pt completed full treatment for latent TB in 2023.  Pt was started on Benlysta since 1/2024 without any improvement in her symptoms including rashes so far.  - Obtain medication safety and disease aciiivity labs. - Rx AZA 50mg PO daily -> BID until seen by me next (Can be increased up to ~150mg/day - 2.5mg/kg/day). Side effects of AZA were discussed with the patient in detail including but not limited to GI upset, hepatotoxicity, and myelosuppression with cytopenias. This is a high-risk therapy requiring intense monitoring for toxicity. Will evaluate with frequent monitoring of Cr, CBC, and LFTs. - c/w Benlysta 200mg SQ QWeekly. Advised to watch for immunosuppression, injection side reactions.  - Last Hep B/C negative 3/2023. Repeat today. - Pt is s/p latent TB treatment with ID in 2023.  - Pt underwent EGD and barium swallow 2021 which was reportedly normal. Pt's CPK and aldolase normal. If significantly worsening symptoms and objective signs of esophageal involvement, will consider IVIG, etc.  - Pt denies any pulmonary/cardiac symptoms at this time. If any significant symptoms, will consider CXR (reportedly normal 8/2021), echo, PFTs (8/2021) in future  - RTC 2 months for follow up.

## 2024-03-16 LAB
ALBUMIN SERPL ELPH-MCNC: 4.3 G/DL
ALP BLD-CCNC: 82 U/L
ALT SERPL-CCNC: 21 U/L
ANION GAP SERPL CALC-SCNC: 13 MMOL/L
AST SERPL-CCNC: 22 U/L
BILIRUB SERPL-MCNC: 0.2 MG/DL
BUN SERPL-MCNC: 16 MG/DL
CALCIUM SERPL-MCNC: 9.3 MG/DL
CHLORIDE SERPL-SCNC: 102 MMOL/L
CO2 SERPL-SCNC: 23 MMOL/L
CREAT SERPL-MCNC: 0.7 MG/DL
CREAT SPEC-SCNC: 14 MG/DL
CREAT/PROT UR: NORMAL RATIO
CRP SERPL-MCNC: <3 MG/L
EGFR: 98 ML/MIN/1.73M2
ERYTHROCYTE [SEDIMENTATION RATE] IN BLOOD BY WESTERGREN METHOD: 15 MM/HR
GLUCOSE SERPL-MCNC: 98 MG/DL
HBV CORE IGG+IGM SER QL: NONREACTIVE
HBV SURFACE AB SER QL: NONREACTIVE
HBV SURFACE AG SER QL: NONREACTIVE
HCT VFR BLD CALC: 32.1 %
HCV AB SER QL: NONREACTIVE
HCV S/CO RATIO: 0.09 S/CO
HGB BLD-MCNC: 11 G/DL
MCHC RBC-ENTMCNC: 34.3 GM/DL
MCHC RBC-ENTMCNC: 34.3 PG
MCV RBC AUTO: 100 FL
PLATELET # BLD AUTO: 270 K/UL
POTASSIUM SERPL-SCNC: 3.6 MMOL/L
PROT SERPL-MCNC: 6.2 G/DL
PROT UR-MCNC: <4 MG/DL
RBC # BLD: 3.21 M/UL
RBC # FLD: 12.2 %
SODIUM SERPL-SCNC: 138 MMOL/L
WBC # FLD AUTO: 2.62 K/UL

## 2024-03-17 LAB
APPEARANCE: CLEAR
BILIRUBIN URINE: NEGATIVE
BLOOD URINE: NEGATIVE
COLOR: YELLOW
GLUCOSE QUALITATIVE U: NEGATIVE MG/DL
KETONES URINE: NEGATIVE MG/DL
LEUKOCYTE ESTERASE URINE: NEGATIVE
NITRITE URINE: NEGATIVE
PH URINE: 6.5
PROTEIN URINE: NEGATIVE MG/DL
SPECIFIC GRAVITY URINE: 1.01
UROBILINOGEN URINE: 0.2 MG/DL

## 2024-03-18 LAB
C3 SERPL-MCNC: 91 MG/DL
C4 SERPL-MCNC: 19 MG/DL

## 2024-03-19 LAB — DSDNA AB SER-ACNC: <12 IU/ML

## 2024-03-22 LAB
TPMT ENZYME INTERPRETATION: NORMAL
TPMT ENZYME METHODOLOGY: NORMAL
TPMT ENZYME: 14.5

## 2024-05-24 ENCOUNTER — APPOINTMENT (OUTPATIENT)
Dept: RHEUMATOLOGY | Facility: CLINIC | Age: 64
End: 2024-05-24
Payer: COMMERCIAL

## 2024-05-24 VITALS
HEART RATE: 67 BPM | HEIGHT: 66 IN | OXYGEN SATURATION: 99 % | WEIGHT: 142 LBS | DIASTOLIC BLOOD PRESSURE: 78 MMHG | BODY MASS INDEX: 22.82 KG/M2 | SYSTOLIC BLOOD PRESSURE: 130 MMHG | TEMPERATURE: 97.3 F

## 2024-05-24 DIAGNOSIS — Z79.899 OTHER LONG TERM (CURRENT) DRUG THERAPY: ICD-10-CM

## 2024-05-24 DIAGNOSIS — R53.82 CHRONIC FATIGUE, UNSPECIFIED: ICD-10-CM

## 2024-05-24 DIAGNOSIS — M35.9 SYSTEMIC INVOLVEMENT OF CONNECTIVE TISSUE, UNSPECIFIED: ICD-10-CM

## 2024-05-24 DIAGNOSIS — M32.9 SYSTEMIC LUPUS ERYTHEMATOSUS, UNSPECIFIED: ICD-10-CM

## 2024-05-24 PROCEDURE — 99214 OFFICE O/P EST MOD 30 MIN: CPT

## 2024-05-24 PROCEDURE — G2211 COMPLEX E/M VISIT ADD ON: CPT

## 2024-05-24 RX ORDER — BELIMUMAB 200 MG/ML
200 SOLUTION SUBCUTANEOUS
Qty: 4 | Refills: 5 | Status: ACTIVE | COMMUNITY
Start: 2024-01-15 | End: 1900-01-01

## 2024-05-24 RX ORDER — PREDNISONE 10 MG/1
10 TABLET ORAL
Qty: 85 | Refills: 0 | Status: DISCONTINUED | COMMUNITY
Start: 2024-01-15 | End: 2024-05-24

## 2024-05-24 RX ORDER — AZATHIOPRINE 75 MG/1
75 TABLET ORAL
Qty: 180 | Refills: 1 | Status: ACTIVE | COMMUNITY
Start: 2024-03-15 | End: 1900-01-01

## 2024-05-24 NOTE — PHYSICAL EXAM
[TextEntry] : GENERAL: Appears in no acute distress HEENT: EOMI, PERRLA. No conjunctival erythema. Moist mucous membranes. No nasopharyngeal ulcers NECK: Supple, no cervical lymphadenopathy, no thyromegaly CARDIOVASCULAR: RRR. S1, S2 auscultated. No murmurs or rubs. PULMONARY: Clear to auscultation b/l, no wheezes, rales, or crackles ABDOMINAL: Soft, nontender, nondistended. Bowel sounds present. No organomegaly. MSK: No active synovitis, swelling, erythema, or warmth. No joint tenderness to palpation. No deformities. Normal ROM of neck, back, b/l upper and lower extremities. SKIN: Improved scalp alopecia with mild erythema and scabbing Erythema of b/l MCPs, fingertips. No active digital tip pits or ulcers (2/2022: Normal nailfold capillaries on capillaroscopy) No skin thickening NEURO: No focal deficits. PSYCH: AAOx3. Normal affect and thought process.

## 2024-05-24 NOTE — ASSESSMENT
[FreeTextEntry1] : 62 y/o female presents as follow up for SLE, MCTD.  Pt reports 14 months of dizziness, intermittent pruritus, blisters on fingertips, underside of toes with burning feelings, redness of knuckles, pain in achilles, sore throat, tongue, swollen throat, numbness of mouth, throat, lips, burning chest pain, cough, mucus in throat. Pt has mild psoriasis of face and scalp - face was treated with laser therapy. Pt has seen multiple specialists for these symptoms with diagnosis only of laryngeal reflux. Pt has been treated for GERD with PPI without any improvement in the chest burning.  No family Hx of rheum diseases   Workup by me with KENA+, Smith+, U1-RNP+, mild known leukopenia.  Based on patient's joint pains, Raynaud's, rashes, labwork, pt's presentation is most consistent with SLE and MCTD. Labwork without any internal organ involvement.  Pt was started on HCQ on 3/2022 but stopped due to possible side effect of unsteadiness, numbness/tingling of b/l legs, worsened irritability and depression with resolution of symptoms.   Pt was tried on amlodipine 5mg/day for Raynaud's but stopped due to weight gain (not water retention) as side effect. Pt's Raynaud's symptoms currently stable.   Pt has had diagnosis of scalp psoriasis with associated patchy alopecia refractory to topical treatments, Skyrizi, and Cosentyx. Pt had skin biopsy on 12/2023 which showed interface dermatitis. Pt no longer has diagnosis of psoriasis and the rash is considered to be part of her underlying autoimmune disease SLE/MCTD.  Patient needs step up in therapy for her underlying SLE/MCTD given pt's uncontrolled joint, skin, constitutional symptoms with side effects on HCQ in the past.  Pt completed full treatment for latent TB in 2023.  Pt was started on Benlysta since 1/2024. Pt was also started on AZA in 3/2024. With this combination, pt reports slow improvement in her rashes and alopecia.  Pt had R neck lymph node swelling 1.5 month ago in setting of likely viral infection. Pt held AZA and restarted 2 weeks after she recovered. Pt has noticed continuing R neck lymph node swelling (and possibly L neck lymph node swelling) without any other symptoms.  - Pt will follow up with PCP in 4 weeks and obtain labwork.  - Pt to be considered for lymph node US if swelling continues. Should be evaluated for infection, AZA side effect, heme malignancy if continues. - c/w AZA 50mg PO BID for now (Can be increased up to ~150mg/day - 2.5mg/kg/day). Side effects of AZA were discussed with the patient in detail including but not limited to GI upset, hepatotoxicity, and myelosuppression with cytopenias.  This is a high-risk therapy requiring intense monitoring for toxicity. Will evaluate with frequent monitoring of Cr, CBC, and LFTs.  - c/w Benlysta 200mg SQ QWeekly. Advised to watch for immunosuppression, injection side reactions.  - Last Hep B/C negative 3/2024.  - Pt is s/p latent TB treatment with ID in 2023.  - Pt underwent EGD and barium swallow 2021 which was reportedly normal. Pt's CPK and aldolase normal. If significantly worsening symptoms and objective signs of esophageal involvement, will consider IVIG, etc.  - Pt denies any pulmonary/cardiac symptoms at this time. If any significant symptoms, will consider CXR (reportedly normal 8/2021), echo, PFTs (8/2021) in future  - RTC 3 months for follow up.

## 2024-05-30 DIAGNOSIS — R22.1 LOCALIZED SWELLING, MASS AND LUMP, NECK: ICD-10-CM

## 2024-06-05 ENCOUNTER — APPOINTMENT (OUTPATIENT)
Dept: ULTRASOUND IMAGING | Facility: CLINIC | Age: 64
End: 2024-06-05
Payer: COMMERCIAL

## 2024-06-05 PROCEDURE — 76536 US EXAM OF HEAD AND NECK: CPT

## 2024-07-15 DIAGNOSIS — M32.9 SYSTEMIC LUPUS ERYTHEMATOSUS, UNSPECIFIED: ICD-10-CM

## 2024-07-15 DIAGNOSIS — Z79.899 OTHER LONG TERM (CURRENT) DRUG THERAPY: ICD-10-CM

## 2024-07-15 RX ORDER — PREDNISONE 10 MG/1
10 TABLET ORAL
Qty: 45 | Refills: 0 | Status: ACTIVE | COMMUNITY
Start: 2024-07-15 | End: 1900-01-01

## 2024-07-16 ENCOUNTER — APPOINTMENT (OUTPATIENT)
Dept: INTERNAL MEDICINE | Facility: CLINIC | Age: 64
End: 2024-07-16

## 2024-07-16 ENCOUNTER — NON-APPOINTMENT (OUTPATIENT)
Age: 64
End: 2024-07-16

## 2024-07-16 ENCOUNTER — OUTPATIENT (OUTPATIENT)
Dept: OUTPATIENT SERVICES | Facility: HOSPITAL | Age: 64
LOS: 1 days | End: 2024-07-16

## 2024-07-16 DIAGNOSIS — Z98.890 OTHER SPECIFIED POSTPROCEDURAL STATES: Chronic | ICD-10-CM

## 2024-07-16 DIAGNOSIS — Z90.710 ACQUIRED ABSENCE OF BOTH CERVIX AND UTERUS: Chronic | ICD-10-CM

## 2024-07-16 DIAGNOSIS — Z90.89 ACQUIRED ABSENCE OF OTHER ORGANS: Chronic | ICD-10-CM

## 2024-07-16 DIAGNOSIS — Z98.41 CATARACT EXTRACTION STATUS, RIGHT EYE: Chronic | ICD-10-CM

## 2024-07-16 DIAGNOSIS — Z98.1 ARTHRODESIS STATUS: Chronic | ICD-10-CM

## 2024-07-17 LAB
ALBUMIN SERPL ELPH-MCNC: 4.6 G/DL
ALP BLD-CCNC: 87 U/L
ALT SERPL-CCNC: 80 U/L
ANION GAP SERPL CALC-SCNC: 11 MMOL/L
AST SERPL-CCNC: 53 U/L
BILIRUB SERPL-MCNC: 0.4 MG/DL
BUN SERPL-MCNC: 12 MG/DL
CALCIUM SERPL-MCNC: 9 MG/DL
CHLORIDE SERPL-SCNC: 101 MMOL/L
CO2 SERPL-SCNC: 24 MMOL/L
CREAT SERPL-MCNC: 0.68 MG/DL
EGFR: 99 ML/MIN/1.73M2
GLUCOSE SERPL-MCNC: 97 MG/DL
HCT VFR BLD CALC: 34.1 %
HGB BLD-MCNC: 11.6 G/DL
MCHC RBC-ENTMCNC: 34 GM/DL
MCHC RBC-ENTMCNC: 36.1 PG
MCV RBC AUTO: 106.2 FL
PLATELET # BLD AUTO: 255 K/UL
POTASSIUM SERPL-SCNC: 4.6 MMOL/L
PROT SERPL-MCNC: 6.8 G/DL
RBC # BLD: 3.21 M/UL
RBC # FLD: 16.3 %
SODIUM SERPL-SCNC: 136 MMOL/L
WBC # FLD AUTO: 1.67 K/UL

## 2024-08-16 ENCOUNTER — APPOINTMENT (OUTPATIENT)
Dept: RHEUMATOLOGY | Facility: CLINIC | Age: 64
End: 2024-08-16
Payer: COMMERCIAL

## 2024-08-16 ENCOUNTER — LABORATORY RESULT (OUTPATIENT)
Age: 64
End: 2024-08-16

## 2024-08-16 VITALS
BODY MASS INDEX: 23.3 KG/M2 | TEMPERATURE: 95.7 F | HEART RATE: 81 BPM | OXYGEN SATURATION: 98 % | HEIGHT: 66 IN | DIASTOLIC BLOOD PRESSURE: 78 MMHG | WEIGHT: 145 LBS | SYSTOLIC BLOOD PRESSURE: 133 MMHG

## 2024-08-16 DIAGNOSIS — I73.00 RAYNAUD'S SYNDROME W/OUT GANGRENE: ICD-10-CM

## 2024-08-16 DIAGNOSIS — Z79.899 OTHER LONG TERM (CURRENT) DRUG THERAPY: ICD-10-CM

## 2024-08-16 DIAGNOSIS — M35.1 OTHER OVERLAP SYNDROMES: ICD-10-CM

## 2024-08-16 DIAGNOSIS — R53.82 CHRONIC FATIGUE, UNSPECIFIED: ICD-10-CM

## 2024-08-16 PROCEDURE — 99214 OFFICE O/P EST MOD 30 MIN: CPT

## 2024-08-16 PROCEDURE — 36415 COLL VENOUS BLD VENIPUNCTURE: CPT

## 2024-08-16 PROCEDURE — G2211 COMPLEX E/M VISIT ADD ON: CPT

## 2024-08-16 NOTE — HISTORY OF PRESENT ILLNESS
[FreeTextEntry1] : HISTORY:  65 y/o female presents as follow up for SLE, MCTD.  Pt reports 14 months of dizziness, intermittent pruritus, blisters on fingertips, underside of toes with burning feelings, redness of knuckles, pain in achilles, sore throat, tongue, swollen throat, numbness of mouth, throat, lips, burning chest pain, cough, mucus in throat. Pt has mild psoriasis of face and scalp - face was treated with laser therapy. Pt has seen multiple specialists for these symptoms with diagnosis only of laryngeal reflux. Pt has been treated for GERD with PPI without any improvement in the chest burning.  No family Hx of rheum diseases   Workup by me with KENA+, Smith+, U1-RNP+, mild known leukopenia.  Based on patient's joint pains, Raynaud's, rashes, labwork, pt's presentation is most consistent with SLE and MCTD. Labwork without any internal organ involvement.  Pt was started on HCQ on 3/2022 but stopped due to possible side effect of unsteadiness, numbness/tingling of b/l legs, worsened irritability and depression with resolution of symptoms.   Pt was tried on amlodipine 5mg/day for Raynaud's but stopped due to weight gain (not water retention) as side effect. Pt's Raynaud's symptoms currently stable.   Pt has had diagnosis of scalp psoriasis with associated patchy alopecia refractory to topical treatments, Skyrizi, and Cosentyx. Pt had skin biopsy on 12/2023 which showed interface dermatitis. Pt no longer has diagnosis of psoriasis and the rash is considered to be part of her underlying autoimmune disease SLE/MCTD.  Patient needs step up in therapy for her underlying SLE/MCTD given pt's uncontrolled joint, skin, constitutional symptoms with side effects on HCQ in the past.  Pt completed full treatment for latent TB in 2023.  Pt was started on Benlysta since 1/2024. Pt was also started on AZA in 3/2024. With this combination, pt reports slow improvement in her rashes and alopecia.   Pt had R neck lymph node swelling 1.5 month ago in setting of likely viral infection. Pt held AZA and restarted 2 weeks after she recovered. Pt has noticed continuing R neck lymph node swelling (and possibly L neck lymph node swelling) without any other symptoms.   INTERVAL HISTORY:  Pt's AZA was stopped due to worsening leukopenia and migraines. Last labwork from 7/2024 with still severe leukopenia. We would not discuss addition of other medications unless her counts recover. Patient has been continuing on Benlysta. Pt had to restart PDN 10mg with taper due to severe pruritus which returned after stopping AZA.  WORKUP:  Remarkable for (2/2022 - 3/2022): KENA 1:160 speckled, U1-RNP Ab 53 (nml <20), Coulter+, UPCR 0.3, 24-hr urine protein 300, HgbA1C 5.8%, ferritin 224, WBC 3.43  Normal/neg (2/2022): CBC (except WBC), ESR, CRP, C3, C4, SSA, SSB, RNP, Thyroid Ab, CCP, cryoglobulins, ANCAs, APLS labs, SCl-70, centromere Ab, RNA Poll III, Radha-1, Myomarker except U1-RNP, CPK, aldolase, TSH, B12, Folate, Vit D 25-OH, iron panel, LDH, SPEP, Lyme, Hep B/C  Punch biopsy scalp rash (12/2023): Superficial and deep perivascular and periadnexal lymphoplasmacytic inflammation with interface changes.

## 2024-08-16 NOTE — ASSESSMENT
[FreeTextEntry1] : 65 y/o female presents as follow up for SLE, MCTD.  Pt reports 14 months of dizziness, intermittent pruritus, blisters on fingertips, underside of toes with burning feelings, redness of knuckles, pain in achilles, sore throat, tongue, swollen throat, numbness of mouth, throat, lips, burning chest pain, cough, mucus in throat. Pt has mild psoriasis of face and scalp - face was treated with laser therapy. Pt has seen multiple specialists for these symptoms with diagnosis only of laryngeal reflux. Pt has been treated for GERD with PPI without any improvement in the chest burning.  No family Hx of rheum diseases   Workup by me with KENA+, Smith+, U1-RNP+, mild known leukopenia.  Based on patient's joint pains, Raynaud's, rashes, labwork, pt's presentation is most consistent with SLE and MCTD. Labwork without any internal organ involvement.  Pt was started on HCQ on 3/2022 but stopped due to possible side effect of unsteadiness, numbness/tingling of b/l legs, worsened irritability and depression with resolution of symptoms.   Pt was tried on amlodipine 5mg/day for Raynaud's but stopped due to weight gain (not water retention) as side effect. Pt's Raynaud's symptoms currently stable.   Pt has had diagnosis of scalp psoriasis with associated patchy alopecia refractory to topical treatments, Skyrizi, and Cosentyx. Pt had skin biopsy on 12/2023 which showed interface dermatitis. Pt no longer has diagnosis of psoriasis and the rash is considered to be part of her underlying autoimmune disease SLE/MCTD.  Patient needs step up in therapy for her underlying SLE/MCTD given pt's uncontrolled joint, skin, constitutional symptoms with side effects on HCQ in the past.  Pt completed full treatment for latent TB in 2023.  Pt was started on Benlysta since 1/2024. Pt was also started on AZA in 3/2024. With this combination, pt reports slow improvement in her rashes and alopecia. However, AZA caused severe leukopenia and migraines and was stopped in 7/2024.  Pt had to restart PDN 10mg with taper due to severe pruritus which returned after stopping AZA. Will plan to start patient on MMF after pt's blood counts recover.  - Obtain labwork to follow up pt's blood counts and disease activity. - When blood counts recover, will Rx MMF 500mg PO daily -> BID. - Discussed with patient possible side effects of Cellcept including increased risk of infection, cytopenias, viral infections, acute renal failure. This is a high-risk therapy requiring intense monitoring for toxicity. Will evaluate with frequent monitoring of Cr and CBC. - c/w Benlysta 200mg SQ QWeekly. Advised to watch for immunosuppression, injection side reactions.  - Last Hep B/C negative 3/2024.  - Pt is s/p latent TB treatment with ID in 2023.  - Pt underwent EGD and barium swallow 2021 which was reportedly normal. Pt's CPK and aldolase normal. If significantly worsening symptoms and objective signs of esophageal involvement, will consider IVIG, etc.  - Pt denies any pulmonary/cardiac symptoms at this time. If any significant symptoms, will consider CXR (reportedly normal 8/2021), echo, PFTs (8/2021) in future  - Will contact pt with labwork to see if can be started on MMF. RTC 3 months for follow up.

## 2024-08-16 NOTE — REASON FOR VISIT
[Follow-Up: _____] : a [unfilled] follow-up visit Albendazole Counseling:  I discussed with the patient the risks of albendazole including but not limited to cytopenia, kidney damage, nausea/vomiting and severe allergy.  The patient understands that this medication is being used in an off-label manner.

## 2024-08-17 LAB
ALBUMIN SERPL ELPH-MCNC: 4.6 G/DL
ALP BLD-CCNC: 83 U/L
ALT SERPL-CCNC: 43 U/L
ANION GAP SERPL CALC-SCNC: 11 MMOL/L
AST SERPL-CCNC: 29 U/L
BASOPHILS # BLD AUTO: 0.03 K/UL
BASOPHILS NFR BLD AUTO: 0.9 %
BILIRUB SERPL-MCNC: 0.4 MG/DL
BUN SERPL-MCNC: 19 MG/DL
CALCIUM SERPL-MCNC: 9.8 MG/DL
CHLORIDE SERPL-SCNC: 101 MMOL/L
CO2 SERPL-SCNC: 26 MMOL/L
CREAT SERPL-MCNC: 0.89 MG/DL
DSDNA AB SER-ACNC: 1 IU/ML
EGFR: 73 ML/MIN/1.73M2
EOSINOPHIL # BLD AUTO: 0.03 K/UL
EOSINOPHIL NFR BLD AUTO: 0.9 %
GLUCOSE SERPL-MCNC: 129 MG/DL
HCT VFR BLD CALC: 35.2 %
HGB BLD-MCNC: 11.6 G/DL
LYMPHOCYTES # BLD AUTO: 0.85 K/UL
LYMPHOCYTES NFR BLD AUTO: 27.8 %
MAN DIFF?: NORMAL
MCHC RBC-ENTMCNC: 33 GM/DL
MCHC RBC-ENTMCNC: 35.8 PG
MCV RBC AUTO: 108.6 FL
MONOCYTES # BLD AUTO: 0.16 K/UL
MONOCYTES NFR BLD AUTO: 5.2 %
NEUTROPHILS # BLD AUTO: 1.98 K/UL
NEUTROPHILS NFR BLD AUTO: 65.2 %
PLATELET # BLD AUTO: 239 K/UL
POTASSIUM SERPL-SCNC: 3.8 MMOL/L
PROT SERPL-MCNC: 6.8 G/DL
RBC # BLD: 3.24 M/UL
RBC # FLD: 13.9 %
SODIUM SERPL-SCNC: 138 MMOL/L
WBC # FLD AUTO: 3.04 K/UL

## 2024-08-19 LAB
C3 SERPL-MCNC: 102 MG/DL
C4 SERPL-MCNC: 17 MG/DL

## 2024-09-02 RX ORDER — MYCOPHENOLATE MOFETIL 500 MG/1
500 TABLET ORAL
Qty: 30 | Refills: 2 | Status: ACTIVE | COMMUNITY
Start: 2024-09-02 | End: 1900-01-01

## 2024-11-12 ENCOUNTER — APPOINTMENT (OUTPATIENT)
Dept: MAMMOGRAPHY | Facility: CLINIC | Age: 64
End: 2024-11-12
Payer: COMMERCIAL

## 2024-11-12 PROCEDURE — 77067 SCR MAMMO BI INCL CAD: CPT

## 2024-11-12 PROCEDURE — 77063 BREAST TOMOSYNTHESIS BI: CPT

## 2024-11-22 ENCOUNTER — APPOINTMENT (OUTPATIENT)
Dept: RHEUMATOLOGY | Facility: CLINIC | Age: 64
End: 2024-11-22
Payer: COMMERCIAL

## 2024-11-22 VITALS — SYSTOLIC BLOOD PRESSURE: 142 MMHG | OXYGEN SATURATION: 100 % | DIASTOLIC BLOOD PRESSURE: 92 MMHG | HEART RATE: 81 BPM

## 2024-11-22 DIAGNOSIS — Z79.899 OTHER LONG TERM (CURRENT) DRUG THERAPY: ICD-10-CM

## 2024-11-22 DIAGNOSIS — R53.82 CHRONIC FATIGUE, UNSPECIFIED: ICD-10-CM

## 2024-11-22 DIAGNOSIS — M35.1 OTHER OVERLAP SYNDROMES: ICD-10-CM

## 2024-11-22 DIAGNOSIS — M32.9 SYSTEMIC LUPUS ERYTHEMATOSUS, UNSPECIFIED: ICD-10-CM

## 2024-11-22 DIAGNOSIS — I73.00 RAYNAUD'S SYNDROME W/OUT GANGRENE: ICD-10-CM

## 2024-11-22 PROCEDURE — 36415 COLL VENOUS BLD VENIPUNCTURE: CPT

## 2024-11-22 PROCEDURE — 99214 OFFICE O/P EST MOD 30 MIN: CPT

## 2024-11-22 PROCEDURE — G2211 COMPLEX E/M VISIT ADD ON: CPT | Mod: NC

## 2024-11-22 RX ORDER — HYDROXYCHLOROQUINE SULFATE 200 MG/1
200 TABLET, FILM COATED ORAL
Qty: 90 | Refills: 1 | Status: ACTIVE | COMMUNITY
Start: 2024-11-22 | End: 1900-01-01

## 2024-11-23 LAB
ALBUMIN SERPL ELPH-MCNC: 4.5 G/DL
ALP BLD-CCNC: 83 U/L
ALT SERPL-CCNC: 26 U/L
ANION GAP SERPL CALC-SCNC: 13 MMOL/L
AST SERPL-CCNC: 21 U/L
BILIRUB SERPL-MCNC: 0.3 MG/DL
BUN SERPL-MCNC: 14 MG/DL
CALCIUM SERPL-MCNC: 9.5 MG/DL
CHLORIDE SERPL-SCNC: 102 MMOL/L
CO2 SERPL-SCNC: 25 MMOL/L
CREAT SERPL-MCNC: 0.69 MG/DL
CRP SERPL-MCNC: <3 MG/L
EGFR: 98 ML/MIN/1.73M2
ERYTHROCYTE [SEDIMENTATION RATE] IN BLOOD BY WESTERGREN METHOD: 4 MM/HR
GLUCOSE SERPL-MCNC: 112 MG/DL
HCT VFR BLD CALC: 36.6 %
HGB BLD-MCNC: 12.3 G/DL
MCHC RBC-ENTMCNC: 33.6 G/DL
MCHC RBC-ENTMCNC: 35.5 PG
MCV RBC AUTO: 105.8 FL
PLATELET # BLD AUTO: 258 K/UL
POTASSIUM SERPL-SCNC: 3.9 MMOL/L
PROT SERPL-MCNC: 6.8 G/DL
RBC # BLD: 3.46 M/UL
RBC # FLD: 11.7 %
SODIUM SERPL-SCNC: 139 MMOL/L
WBC # FLD AUTO: 3.88 K/UL

## 2025-01-14 ENCOUNTER — APPOINTMENT (OUTPATIENT)
Dept: INTERNAL MEDICINE | Facility: CLINIC | Age: 65
End: 2025-01-14

## 2025-01-14 ENCOUNTER — OUTPATIENT (OUTPATIENT)
Dept: OUTPATIENT SERVICES | Facility: HOSPITAL | Age: 65
LOS: 1 days | End: 2025-01-14

## 2025-01-14 ENCOUNTER — NON-APPOINTMENT (OUTPATIENT)
Age: 65
End: 2025-01-14

## 2025-01-14 DIAGNOSIS — Z98.890 OTHER SPECIFIED POSTPROCEDURAL STATES: Chronic | ICD-10-CM

## 2025-01-14 DIAGNOSIS — Z98.41 CATARACT EXTRACTION STATUS, RIGHT EYE: Chronic | ICD-10-CM

## 2025-01-14 DIAGNOSIS — Z90.89 ACQUIRED ABSENCE OF OTHER ORGANS: Chronic | ICD-10-CM

## 2025-01-14 DIAGNOSIS — Z90.710 ACQUIRED ABSENCE OF BOTH CERVIX AND UTERUS: Chronic | ICD-10-CM

## 2025-01-14 DIAGNOSIS — Z98.1 ARTHRODESIS STATUS: Chronic | ICD-10-CM

## 2025-03-28 ENCOUNTER — LABORATORY RESULT (OUTPATIENT)
Age: 65
End: 2025-03-28

## 2025-03-28 ENCOUNTER — APPOINTMENT (OUTPATIENT)
Dept: RHEUMATOLOGY | Facility: CLINIC | Age: 65
End: 2025-03-28
Payer: COMMERCIAL

## 2025-03-28 VITALS
HEIGHT: 66 IN | DIASTOLIC BLOOD PRESSURE: 86 MMHG | BODY MASS INDEX: 22.34 KG/M2 | OXYGEN SATURATION: 99 % | SYSTOLIC BLOOD PRESSURE: 139 MMHG | HEART RATE: 72 BPM | WEIGHT: 139 LBS | TEMPERATURE: 95.6 F

## 2025-03-28 DIAGNOSIS — I73.00 RAYNAUD'S SYNDROME W/OUT GANGRENE: ICD-10-CM

## 2025-03-28 DIAGNOSIS — M35.1 OTHER OVERLAP SYNDROMES: ICD-10-CM

## 2025-03-28 DIAGNOSIS — M32.9 SYSTEMIC LUPUS ERYTHEMATOSUS, UNSPECIFIED: ICD-10-CM

## 2025-03-28 DIAGNOSIS — Z79.899 OTHER LONG TERM (CURRENT) DRUG THERAPY: ICD-10-CM

## 2025-03-28 PROCEDURE — 36415 COLL VENOUS BLD VENIPUNCTURE: CPT

## 2025-03-28 PROCEDURE — G2211 COMPLEX E/M VISIT ADD ON: CPT | Mod: NC

## 2025-03-28 PROCEDURE — 99214 OFFICE O/P EST MOD 30 MIN: CPT

## 2025-03-28 RX ORDER — DAPSONE 25 MG/1
25 TABLET ORAL DAILY
Qty: 90 | Refills: 0 | Status: ACTIVE | COMMUNITY
Start: 2025-03-28 | End: 1900-01-01

## 2025-03-29 LAB
ALBUMIN SERPL ELPH-MCNC: 4.6 G/DL
ALP BLD-CCNC: 83 U/L
ALT SERPL-CCNC: 33 U/L
ANION GAP SERPL CALC-SCNC: 13 MMOL/L
APPEARANCE: CLEAR
AST SERPL-CCNC: 26 U/L
BASOPHILS # BLD AUTO: 0.02 K/UL
BASOPHILS NFR BLD AUTO: 0.4 %
BILIRUB SERPL-MCNC: 0.3 MG/DL
BILIRUBIN URINE: NEGATIVE
BLOOD URINE: NEGATIVE
BUN SERPL-MCNC: 18 MG/DL
CALCIUM SERPL-MCNC: 9.5 MG/DL
CHLORIDE SERPL-SCNC: 102 MMOL/L
CO2 SERPL-SCNC: 24 MMOL/L
COLOR: YELLOW
CREAT SERPL-MCNC: 0.75 MG/DL
CREAT SPEC-SCNC: 14 MG/DL
CREAT/PROT UR: 0.5 RATIO
CRP SERPL-MCNC: <3 MG/L
EGFRCR SERPLBLD CKD-EPI 2021: 90 ML/MIN/1.73M2
EOSINOPHIL # BLD AUTO: 0.01 K/UL
EOSINOPHIL NFR BLD AUTO: 0.2 %
ERYTHROCYTE [SEDIMENTATION RATE] IN BLOOD BY WESTERGREN METHOD: 7 MM/HR
GLUCOSE QUALITATIVE U: NEGATIVE MG/DL
GLUCOSE SERPL-MCNC: 105 MG/DL
HAV IGM SER QL: NONREACTIVE
HBV CORE IGG+IGM SER QL: NONREACTIVE
HBV CORE IGM SER QL: NONREACTIVE
HBV SURFACE AG SER QL: NONREACTIVE
HCT VFR BLD CALC: 36 %
HCV AB SER QL: NONREACTIVE
HCV S/CO RATIO: 0.12 S/CO
HGB BLD-MCNC: 12.3 G/DL
IMM GRANULOCYTES NFR BLD AUTO: 0.2 %
KETONES URINE: NEGATIVE MG/DL
LEUKOCYTE ESTERASE URINE: ABNORMAL
LYMPHOCYTES # BLD AUTO: 0.75 K/UL
LYMPHOCYTES NFR BLD AUTO: 16.8 %
MAN DIFF?: NORMAL
MCHC RBC-ENTMCNC: 34.2 G/DL
MCHC RBC-ENTMCNC: 35 PG
MCV RBC AUTO: 102.6 FL
MONOCYTES # BLD AUTO: 0.19 K/UL
MONOCYTES NFR BLD AUTO: 4.3 %
NEUTROPHILS # BLD AUTO: 3.49 K/UL
NEUTROPHILS NFR BLD AUTO: 78.1 %
NITRITE URINE: NEGATIVE
PH URINE: 6.5
PLATELET # BLD AUTO: 283 K/UL
POTASSIUM SERPL-SCNC: 3.9 MMOL/L
PROT SERPL-MCNC: 6.8 G/DL
PROT UR-MCNC: 7 MG/DL
PROTEIN URINE: NEGATIVE MG/DL
RBC # BLD: 3.51 M/UL
RBC # FLD: 12.2 %
SODIUM SERPL-SCNC: 140 MMOL/L
SPECIFIC GRAVITY URINE: 1.01
UROBILINOGEN URINE: 0.2 MG/DL
WBC # FLD AUTO: 4.47 K/UL

## 2025-03-31 LAB — DSDNA AB SER-ACNC: 1 IU/ML

## 2025-04-01 LAB
C3 SERPL-MCNC: 98 MG/DL
C4 SERPL-MCNC: 17 MG/DL

## 2025-04-04 LAB — G6PD SER-CCNC: 16 U/G HGB

## 2025-05-26 ENCOUNTER — RX RENEWAL (OUTPATIENT)
Age: 65
End: 2025-05-26

## 2025-07-11 ENCOUNTER — APPOINTMENT (OUTPATIENT)
Dept: RHEUMATOLOGY | Facility: CLINIC | Age: 65
End: 2025-07-11
Payer: COMMERCIAL

## 2025-07-11 ENCOUNTER — LABORATORY RESULT (OUTPATIENT)
Age: 65
End: 2025-07-11

## 2025-07-11 VITALS
DIASTOLIC BLOOD PRESSURE: 77 MMHG | WEIGHT: 138 LBS | HEART RATE: 67 BPM | TEMPERATURE: 94.7 F | HEIGHT: 67 IN | OXYGEN SATURATION: 98 % | SYSTOLIC BLOOD PRESSURE: 145 MMHG | BODY MASS INDEX: 21.66 KG/M2

## 2025-07-11 PROCEDURE — G2211 COMPLEX E/M VISIT ADD ON: CPT | Mod: NC

## 2025-07-11 PROCEDURE — 36415 COLL VENOUS BLD VENIPUNCTURE: CPT

## 2025-07-11 PROCEDURE — 99214 OFFICE O/P EST MOD 30 MIN: CPT

## 2025-07-13 LAB
ALBUMIN SERPL ELPH-MCNC: 4.6 G/DL
ALP BLD-CCNC: 102 U/L
ALT SERPL-CCNC: 58 U/L
ANION GAP SERPL CALC-SCNC: 14 MMOL/L
AST SERPL-CCNC: 39 U/L
BASOPHILS # BLD AUTO: 0.06 K/UL
BASOPHILS NFR BLD AUTO: 1.5 %
BILIRUB SERPL-MCNC: 0.3 MG/DL
BUN SERPL-MCNC: 19 MG/DL
CALCIUM SERPL-MCNC: 9.1 MG/DL
CHLORIDE SERPL-SCNC: 100 MMOL/L
CO2 SERPL-SCNC: 24 MMOL/L
CREAT SERPL-MCNC: 0.72 MG/DL
CRP SERPL-MCNC: <3 MG/L
EGFRCR SERPLBLD CKD-EPI 2021: 94 ML/MIN/1.73M2
EOSINOPHIL # BLD AUTO: 0.06 K/UL
EOSINOPHIL NFR BLD AUTO: 1.5 %
ERYTHROCYTE [SEDIMENTATION RATE] IN BLOOD BY WESTERGREN METHOD: 2 MM/HR
GLUCOSE SERPL-MCNC: 110 MG/DL
HCT VFR BLD CALC: 35 %
HGB BLD-MCNC: 11.1 G/DL
IMM GRANULOCYTES NFR BLD AUTO: 0 %
LYMPHOCYTES # BLD AUTO: 0.89 K/UL
LYMPHOCYTES NFR BLD AUTO: 22.1 %
MAN DIFF?: NORMAL
MCHC RBC-ENTMCNC: 31.7 G/DL
MCHC RBC-ENTMCNC: 34.4 PG
MCV RBC AUTO: 108.4 FL
MONOCYTES # BLD AUTO: 0.3 K/UL
MONOCYTES NFR BLD AUTO: 7.4 %
NEUTROPHILS # BLD AUTO: 2.72 K/UL
NEUTROPHILS NFR BLD AUTO: 67.5 %
PLATELET # BLD AUTO: 268 K/UL
POTASSIUM SERPL-SCNC: 4.2 MMOL/L
PROT SERPL-MCNC: 6.7 G/DL
RBC # BLD: 3.23 M/UL
RBC # FLD: 11.6 %
SODIUM SERPL-SCNC: 137 MMOL/L
WBC # FLD AUTO: 4.03 K/UL

## 2025-07-18 ENCOUNTER — OUTPATIENT (OUTPATIENT)
Dept: OUTPATIENT SERVICES | Facility: HOSPITAL | Age: 65
LOS: 1 days | End: 2025-07-18

## 2025-07-18 ENCOUNTER — NON-APPOINTMENT (OUTPATIENT)
Age: 65
End: 2025-07-18

## 2025-07-18 ENCOUNTER — APPOINTMENT (OUTPATIENT)
Dept: INTERNAL MEDICINE | Facility: CLINIC | Age: 65
End: 2025-07-18

## 2025-07-18 DIAGNOSIS — Z98.890 OTHER SPECIFIED POSTPROCEDURAL STATES: Chronic | ICD-10-CM

## 2025-07-18 DIAGNOSIS — Z98.41 CATARACT EXTRACTION STATUS, RIGHT EYE: Chronic | ICD-10-CM

## 2025-07-18 DIAGNOSIS — Z90.710 ACQUIRED ABSENCE OF BOTH CERVIX AND UTERUS: Chronic | ICD-10-CM

## 2025-07-18 DIAGNOSIS — Z98.1 ARTHRODESIS STATUS: Chronic | ICD-10-CM

## 2025-07-18 DIAGNOSIS — Z90.89 ACQUIRED ABSENCE OF OTHER ORGANS: Chronic | ICD-10-CM
